# Patient Record
Sex: FEMALE | Race: WHITE | NOT HISPANIC OR LATINO | Employment: OTHER | URBAN - METROPOLITAN AREA
[De-identification: names, ages, dates, MRNs, and addresses within clinical notes are randomized per-mention and may not be internally consistent; named-entity substitution may affect disease eponyms.]

---

## 2023-09-09 PROBLEM — E55.9 VITAMIN D DEFICIENCY: Status: ACTIVE | Noted: 2023-09-09

## 2023-09-09 PROBLEM — E03.8 SUBCLINICAL HYPOTHYROIDISM: Status: ACTIVE | Noted: 2023-09-09

## 2023-09-09 PROBLEM — J01.10 ACUTE NON-RECURRENT FRONTAL SINUSITIS: Status: ACTIVE | Noted: 2023-09-09

## 2023-09-09 PROBLEM — N95.1 MENOPAUSAL STATE: Status: ACTIVE | Noted: 2023-09-09

## 2023-09-09 PROBLEM — D72.819 LEUKOPENIA: Status: ACTIVE | Noted: 2023-09-09

## 2023-09-09 PROBLEM — I25.10 ARTERIOSCLEROTIC HEART DISEASE: Status: ACTIVE | Noted: 2023-09-09

## 2023-09-09 PROBLEM — M81.0 OSTEOPOROSIS: Status: ACTIVE | Noted: 2023-09-09

## 2023-09-09 PROBLEM — E78.5 HYPERLIPIDEMIA: Status: ACTIVE | Noted: 2023-09-09

## 2023-09-09 PROBLEM — M85.80 OSTEOPENIA: Status: ACTIVE | Noted: 2023-09-09

## 2023-09-09 RX ORDER — IBUPROFEN 200 MG
200 TABLET ORAL
COMMUNITY

## 2023-09-09 RX ORDER — AMOXICILLIN 875 MG/1
875 TABLET, FILM COATED ORAL
COMMUNITY
Start: 2022-07-15 | End: 2024-04-16 | Stop reason: WASHOUT

## 2023-09-09 RX ORDER — CHOLECALCIFEROL (VITAMIN D3) 50 MCG
50 TABLET ORAL DAILY
COMMUNITY

## 2023-09-09 RX ORDER — CALCIUM CARBONATE 600 MG
600 TABLET ORAL
COMMUNITY

## 2023-09-27 ENCOUNTER — HOSPITAL ENCOUNTER (OUTPATIENT)
Dept: DATA CONVERSION | Facility: HOSPITAL | Age: 68
Discharge: HOME | End: 2023-09-27
Payer: MEDICARE

## 2023-09-27 DIAGNOSIS — E78.5 HYPERLIPIDEMIA, UNSPECIFIED: ICD-10-CM

## 2023-09-27 LAB
APPEARANCE PLAS: ABNORMAL
CHOLEST SERPL-MCNC: 277 MG/DL (ref 133–200)
CHOLEST/HDLC SERPL: 4.8 RATIO
COLOR SPUN FLD: ABNORMAL
FASTING STATUS PATIENT QL REPORTED: ABNORMAL
HDLC SERPL-MCNC: 58 MG/DL
LDLC SERPL CALC-MCNC: 181 MG/DL (ref 65–130)
TRIGL SERPL-MCNC: 190 MG/DL (ref 40–150)

## 2023-10-31 ENCOUNTER — TELEPHONE (OUTPATIENT)
Dept: PRIMARY CARE | Facility: CLINIC | Age: 68
End: 2023-10-31
Payer: MEDICARE

## 2023-10-31 DIAGNOSIS — H66.90 OTITIS MEDIA, UNSPECIFIED LATERALITY, UNSPECIFIED OTITIS MEDIA TYPE: Primary | ICD-10-CM

## 2023-10-31 RX ORDER — AZITHROMYCIN 250 MG/1
TABLET, FILM COATED ORAL
Qty: 6 TABLET | Refills: 0 | Status: SHIPPED | OUTPATIENT
Start: 2023-10-31 | End: 2023-11-05

## 2023-10-31 NOTE — TELEPHONE ENCOUNTER
Pt states she went to  [facility] on 10/22/23 for left ear pain and was given amoxicillin TID which she completed on 10/28/23.     Pt states she still gets occasional sharp pains in left ear and wondering if she needs another round of antibiotics or should be evaluated further.  Please advise.  Ph: [phone]

## 2023-11-07 ASSESSMENT — ENCOUNTER SYMPTOMS
ABDOMINAL PAIN: 0
SORE THROAT: 0
RHINORRHEA: 0
HEADACHES: 0
VOMITING: 0
DIARRHEA: 0
COUGH: 0
NECK PAIN: 0

## 2023-11-10 ENCOUNTER — OFFICE VISIT (OUTPATIENT)
Dept: OTOLARYNGOLOGY | Facility: CLINIC | Age: 68
End: 2023-11-10
Payer: MEDICARE

## 2023-11-10 DIAGNOSIS — H65.92 LEFT OTITIS MEDIA WITH EFFUSION: Primary | ICD-10-CM

## 2023-11-10 PROCEDURE — 69433 CREATE EARDRUM OPENING: CPT | Performed by: OTOLARYNGOLOGY

## 2023-11-10 PROCEDURE — 1160F RVW MEDS BY RX/DR IN RCRD: CPT | Performed by: OTOLARYNGOLOGY

## 2023-11-10 PROCEDURE — 1159F MED LIST DOCD IN RCRD: CPT | Performed by: OTOLARYNGOLOGY

## 2023-11-10 PROCEDURE — 99204 OFFICE O/P NEW MOD 45 MIN: CPT | Performed by: OTOLARYNGOLOGY

## 2023-11-10 RX ORDER — DEXAMETHASONE SODIUM PHOSPHATE 1 MG/ML
SOLUTION/ DROPS OPHTHALMIC
Qty: 5 ML | Refills: 0 | Status: SHIPPED | OUTPATIENT
Start: 2023-11-10 | End: 2024-04-16 | Stop reason: WASHOUT

## 2023-11-10 RX ORDER — OFLOXACIN 3 MG/ML
SOLUTION AURICULAR (OTIC)
Qty: 5 ML | Refills: 0 | Status: SHIPPED | OUTPATIENT
Start: 2023-11-10 | End: 2024-04-16 | Stop reason: WASHOUT

## 2023-11-10 ASSESSMENT — ENCOUNTER SYMPTOMS
COUGH: 0
VOMITING: 0
RHINORRHEA: 0
SORE THROAT: 0
DIARRHEA: 0
ABDOMINAL PAIN: 0
NECK PAIN: 0
HEADACHES: 0

## 2023-11-10 NOTE — LETTER
November 10, 2023     Castro Mackenzie MD  [address] [place] [address]  [address]  [place] [address]    Patient: Angela Samuel   YOB: 1955   Date of Visit: 11/10/2023       Dear Dr. Castro Mackenzie MD:    Thank you for referring Angela Samuel to me for evaluation. Below are my notes for this consultation.  If you have questions, please do not hesitate to call me. I look forward to following your patient along with you.       Sincerely,     Kathrin Wood MD      CC: No Recipients  ______________________________________________________________________________________    History Of Present Illness  Angela Samuel is a 68 y.o. female presenting with plugged left ear, which has been going on for the past 5 weeks. It happened after a uri. She has tried fluticasone nasal spray, oral amoxicillin and z pack. She has a history of left ear tubes, twice.     Off and on her left ear plugs. She has an upcoming flight next week.    On examination, effusion in the left middle ear(+).  Left myringotomy and tube insertion was done today in the office.  There was seromucoid effusion (mostly serous).    Plan:  1-follow-up in 1 month, with hearing test  2- ear drops       Medical History:  She has no past medical history on file.    Surgical History  She has no past surgical history on file.     Social History  She has no history on file for tobacco use, alcohol use, and drug use.    Family History  Family History   Problem Relation Name Age of Onset   • Diabetes Mother     • Hypertension Mother     • Kidney disease Mother     • No Known Problems Father     • Breast cancer Sister          Allergies  Patient has no known allergies.    Review of Systems   HENT:  Positive for ear pain and hearing loss. Negative for ear discharge, rhinorrhea and sore throat.    Respiratory:  Negative for cough.    Gastrointestinal:  Negative for abdominal pain, diarrhea and vomiting.   Musculoskeletal:  Negative for neck pain.   Skin:   Negative for rash.   Neurological:  Negative for headaches.      Decreased hearing in left ear    Physical Exam   General appearance: Healthy-appearing, well-nourished, well groomed, in no acute distress.     Head and Face: Atraumatic with no masses, lesions, or scarring.      Salivary glands: No tenderness of the parotid glands or parotid masses.     No tenderness of the submandibular glands or submandibular masses.      Facial strength: Normal strength and symmetry, no synkinesis or facial tic.     Eyes: Conjunctivas look non-hyperemic bilaterally    Ears: Bilaterally ear canals look normal. Tympanic membranes look intact,effusion (+) at left.    Nose: Mucosa looks normal. No purulent discharge.     Oral Cavity/Mouth: Lips and tongue look normal.     Throat: No postnasal discharge. No tonsil hypertrophy. No hyperemia.    Neck: Symmetrical, trachea midline.     Pulmonary: Normal respiratory effort.     Lymphatic: No palpable pathologic lymph nodes at neck.     Neurological/Psychiatric Orientation to person, place, and time: Normal.     Mood and affect: Normal.      Extremities: No clubbing.     Skin: No significant skin lesions were noted at face or neck    LEFT TUBE INSERTION 11.10.2023  Operative microscope was brought to patient's left ear. Topical phenol was applied inferiorly, then myringotomy was done using 21 g needle. Effusion was suctioned, white neha bobbin tube was inserted. Antibiotic drops were applied.    Procedure was concluded.      Assessment/Plan  Angela Samuel is a 68 y.o. female presenting with plugged left ear, which has been going on for the past 5 weeks. It happened after a uri. She has tried fluticasone nasal spray, oral amoxicillin and z pack. She has a history of left ear tubes, twice.     Off and on her left ear plugs. She has an upcoming flight next week.    On examination, effusion in the left middle ear(+).  Left myringotomy and tube insertion was done today in the office.   There was seromucoid effusion (mostly serous).    Plan:  1-follow-up in 1 month, with hearing test  2- ear drops    Kathrin Wood MD

## 2023-11-10 NOTE — PROGRESS NOTES
History Of Present Illness  Angela Samuel is a 68 y.o. female presenting with plugged left ear, which has been going on for the past 5 weeks. It happened after a uri. She has tried fluticasone nasal spray, oral amoxicillin and z pack. She has a history of left ear tubes, twice.     Off and on her left ear plugs. She has an upcoming flight next week.    On examination, effusion in the left middle ear(+).  Left myringotomy and tube insertion was done today in the office.  There was seromucoid effusion (mostly serous).    Plan:  1-follow-up in 1 month, with hearing test  2- ear drops       Medical History:  She has no past medical history on file.    Surgical History  She has no past surgical history on file.     Social History  She has no history on file for tobacco use, alcohol use, and drug use.    Family History  Family History   Problem Relation Name Age of Onset    Diabetes Mother      Hypertension Mother      Kidney disease Mother      No Known Problems Father      Breast cancer Sister          Allergies  Patient has no known allergies.    Review of Systems   HENT:  Positive for ear pain and hearing loss. Negative for ear discharge, rhinorrhea and sore throat.    Respiratory:  Negative for cough.    Gastrointestinal:  Negative for abdominal pain, diarrhea and vomiting.   Musculoskeletal:  Negative for neck pain.   Skin:  Negative for rash.   Neurological:  Negative for headaches.      Decreased hearing in left ear    Physical Exam   General appearance: Healthy-appearing, well-nourished, well groomed, in no acute distress.     Head and Face: Atraumatic with no masses, lesions, or scarring.      Salivary glands: No tenderness of the parotid glands or parotid masses.     No tenderness of the submandibular glands or submandibular masses.      Facial strength: Normal strength and symmetry, no synkinesis or facial tic.     Eyes: Conjunctivas look non-hyperemic bilaterally    Ears: Bilaterally ear canals look  normal. Tympanic membranes look intact,effusion (+) at left.    Nose: Mucosa looks normal. No purulent discharge.     Oral Cavity/Mouth: Lips and tongue look normal.     Throat: No postnasal discharge. No tonsil hypertrophy. No hyperemia.    Neck: Symmetrical, trachea midline.     Pulmonary: Normal respiratory effort.     Lymphatic: No palpable pathologic lymph nodes at neck.     Neurological/Psychiatric Orientation to person, place, and time: Normal.     Mood and affect: Normal.      Extremities: No clubbing.     Skin: No significant skin lesions were noted at face or neck    LEFT TUBE INSERTION 11.10.2023  Operative microscope was brought to patient's left ear. Topical phenol was applied inferiorly, then myringotomy was done using 21 g needle. Effusion was suctioned, white neha bobbin tube was inserted. Antibiotic drops were applied.    Procedure was concluded.      Assessment/Plan   Angela Samuel is a 68 y.o. female presenting with plugged left ear, which has been going on for the past 5 weeks. It happened after a uri. She has tried fluticasone nasal spray, oral amoxicillin and z pack. She has a history of left ear tubes, twice.     Off and on her left ear plugs. She has an upcoming flight next week.    On examination, effusion in the left middle ear(+).  Left myringotomy and tube insertion was done today in the office.  There was seromucoid effusion (mostly serous).    Plan:  1-follow-up in 1 month, with hearing test  2- ear drops    Kathrin Wood MD

## 2023-11-29 ENCOUNTER — APPOINTMENT (OUTPATIENT)
Dept: OTOLARYNGOLOGY | Facility: CLINIC | Age: 68
End: 2023-11-29

## 2023-12-06 ENCOUNTER — CLINICAL SUPPORT (OUTPATIENT)
Dept: AUDIOLOGY | Facility: CLINIC | Age: 68
End: 2023-12-06
Payer: MEDICARE

## 2023-12-06 DIAGNOSIS — Z96.22 MYRINGOTOMY TUBE STATUS: Primary | ICD-10-CM

## 2023-12-06 DIAGNOSIS — H93.19 SUBJECTIVE TINNITUS, UNSPECIFIED LATERALITY: ICD-10-CM

## 2023-12-06 DIAGNOSIS — H90.3 SENSORINEURAL HEARING LOSS (SNHL) OF BOTH EARS: ICD-10-CM

## 2023-12-06 DIAGNOSIS — R94.128: ICD-10-CM

## 2023-12-06 PROCEDURE — 92557 COMPREHENSIVE HEARING TEST: CPT | Performed by: AUDIOLOGIST

## 2023-12-06 PROCEDURE — 92567 TYMPANOMETRY: CPT | Performed by: AUDIOLOGIST

## 2023-12-06 ASSESSMENT — ENCOUNTER SYMPTOMS
OCCASIONAL FEELINGS OF UNSTEADINESS: 0
DEPRESSION: 0
LOSS OF SENSATION IN FEET: 0

## 2023-12-06 ASSESSMENT — PAIN - FUNCTIONAL ASSESSMENT: PAIN_FUNCTIONAL_ASSESSMENT: 0-10

## 2023-12-06 ASSESSMENT — PAIN SCALES - GENERAL: PAINLEVEL_OUTOF10: 0 - NO PAIN

## 2023-12-06 NOTE — LETTER
December 6, 2023     Castro Mackenzie MD  [address]  [address]  [place] [address]    Patient: Angela Samuel   YOB: 1955   Date of Visit: 12/6/2023       Dear Dr. Castro Mackenzie MD:    Thank you for referring Angela Samuel to me for evaluation. Below are my notes for this consultation.  If you have questions, please do not hesitate to call me. I look forward to following your patient along with you.       Sincerely,     Sarah J Lombardo, ROSY, CCC-A      CC: Kathrin Wood MD  ______________________________________________________________________________________    Angela Samuel, age 68 years, is here today for a hearing evaluation.     Difficulty hearing - no  Tinnitus - yes, unspecified laterality   Excessive noise exposure - no  Chronic ear infections - yes  Ear pain - no  Ear drainage - no  Past ear surgery - yes, third ear ventilation tube in the left   Vertigo - no  Dizziness - no  Past hearing aid use - no  Family history - no    Appointment time: 1:45-2:20    Otoscopy revealed clear ear canals with visual inspection of the ear ventilation tubes bilaterally.    Behavioral hearing evaluation:  Right ear - normal hearing sensitivity to mild sensorineural hearing loss 250-8000 Hz  Left ear - normal hearing sensitivity to mild sensorineural hearing loss 250-8000 Hz    Speech reception thresholds obtained at a level consistent with pure tone thresholds bilaterally.    Word discrimination:  Right ear - excellent (96%)  Left ear - excellent (96%)    Tympanometry:  Right ear - Type A, normal middle ear function  Left ear - Type B, no pressure peak with a large ear canal volume (consistent with a patent ear ventilation tube)    Ipsilateral acoustic reflexes:  Right ear - present 500-4000 Hz  Left ear - could not be tested due to tube    Recommendations:  1) Follow up with Dr Wood

## 2023-12-06 NOTE — PROGRESS NOTES
Angela Samuel, age 68 years, is here today for a hearing evaluation.     Difficulty hearing - no  Tinnitus - yes, unspecified laterality   Excessive noise exposure - no  Chronic ear infections - yes  Ear pain - no  Ear drainage - no  Past ear surgery - yes, third ear ventilation tube in the left   Vertigo - no  Dizziness - no  Past hearing aid use - no  Family history - no    Appointment time: 1:45-2:20    Otoscopy revealed clear ear canals with visual inspection of the ear ventilation tubes bilaterally.    Behavioral hearing evaluation:  Right ear - normal hearing sensitivity to mild sensorineural hearing loss 250-8000 Hz  Left ear - normal hearing sensitivity to mild sensorineural hearing loss 250-8000 Hz    Speech reception thresholds obtained at a level consistent with pure tone thresholds bilaterally.    Word discrimination:  Right ear - excellent (96%)  Left ear - excellent (96%)    Tympanometry:  Right ear - Type A, normal middle ear function  Left ear - Type B, no pressure peak with a large ear canal volume (consistent with a patent ear ventilation tube)    Ipsilateral acoustic reflexes:  Right ear - present 500-4000 Hz  Left ear - could not be tested due to tube    Recommendations:  1) Follow up with Dr Wood

## 2024-03-26 ENCOUNTER — LAB REQUISITION (OUTPATIENT)
Dept: LAB | Facility: HOSPITAL | Age: 69
End: 2024-03-26
Payer: MEDICARE

## 2024-03-26 PROCEDURE — 87086 URINE CULTURE/COLONY COUNT: CPT

## 2024-03-27 ENCOUNTER — OFFICE VISIT (OUTPATIENT)
Dept: OTOLARYNGOLOGY | Facility: CLINIC | Age: 69
End: 2024-03-27
Payer: MEDICARE

## 2024-03-27 DIAGNOSIS — Z96.22 MYRINGOTOMY TUBE STATUS: Primary | ICD-10-CM

## 2024-03-27 PROCEDURE — 99213 OFFICE O/P EST LOW 20 MIN: CPT | Performed by: OTOLARYNGOLOGY

## 2024-03-27 ASSESSMENT — ENCOUNTER SYMPTOMS
ABDOMINAL PAIN: 0
VOMITING: 0
RHINORRHEA: 0
COUGH: 0
NECK PAIN: 0
HEADACHES: 0
DIARRHEA: 0
SORE THROAT: 0

## 2024-03-27 NOTE — PROGRESS NOTES
History Of Present Illness    03.27.24: She has been to urgent care yesterday for uri, her ears were checked and she was told that there was redness around her left ear tube..     On examination, left tympanic membrane has no hyperemia, there is a prominent blood vessel close to left ventilation tube.  Left ventilation tube looks open.  No granulation or discharge.    Recommendation  1-follow-up in 6 months  ______________________________________________________________________    11.10.2023: Angela Samuel is a 69 y.o. female presenting with plugged left ear, which has been going on for the past 5 weeks. It happened after a uri. She has tried fluticasone nasal spray, oral amoxicillin and z pack. She has a history of left ear tubes, twice.     Off and on her left ear plugs. She has an upcoming flight next week.    On examination, effusion in the left middle ear(+).  Left myringotomy and tube insertion was done today in the office.  There was seromucoid effusion (mostly serous).    Plan:  1-follow-up in 1 month, with hearing test  2- ear drops       Medical History:  She has no past medical history on file.    Surgical History  She has no past surgical history on file.     Social History  She has no history on file for tobacco use, alcohol use, and drug use.    Family History  Family History   Problem Relation Name Age of Onset    Diabetes Mother      Hypertension Mother      Kidney disease Mother      No Known Problems Father      Breast cancer Sister          Allergies  Patient has no known allergies.    Review of Systems   HENT:  Positive for ear pain and hearing loss. Negative for ear discharge, rhinorrhea and sore throat.    Respiratory:  Negative for cough.    Gastrointestinal:  Negative for abdominal pain, diarrhea and vomiting.   Musculoskeletal:  Negative for neck pain.   Skin:  Negative for rash.   Neurological:  Negative for headaches.      Decreased hearing in left ear    Physical Exam   General  appearance: Healthy-appearing, well-nourished, well groomed, in no acute distress.     Head and Face: Atraumatic with no masses, lesions, or scarring.      Salivary glands: No tenderness of the parotid glands or parotid masses.     No tenderness of the submandibular glands or submandibular masses.      Facial strength: Normal strength and symmetry, no synkinesis or facial tic.     Eyes: Conjunctivas look non-hyperemic bilaterally    Ears: Bilaterally ear canals look normal. Tympanic membranes look intact,effusion (+) at left.    Nose: Mucosa looks normal. No purulent discharge.     Oral Cavity/Mouth: Lips and tongue look normal.     Throat: No postnasal discharge. No tonsil hypertrophy. No hyperemia.    Neck: Symmetrical, trachea midline.     Pulmonary: Normal respiratory effort.     Lymphatic: No palpable pathologic lymph nodes at neck.     Neurological/Psychiatric Orientation to person, place, and time: Normal.     Mood and affect: Normal.      Extremities: No clubbing.     Skin: No significant skin lesions were noted at face or neck    LEFT TUBE INSERTION 11.10.2023  Operative microscope was brought to patient's left ear. Topical phenol was applied inferiorly, then myringotomy was done using 21 g needle. Effusion was suctioned, white neha bobbin tube was inserted. Antibiotic drops were applied.    Procedure was concluded.      Assessment/Plan     03.27.24: She has been to urgent care yesterday for uri, her ears were checked and she was told that there was redness around her left ear tube..     On examination, left tympanic membrane has no hyperemia, there is a prominent blood vessel close to left ventilation tube.  Left ventilation tube looks open.  No granulation or discharge.    Recommendation  1-follow-up in 6 months  ______________________________________________________________________    Angela Samuel is a 68 y.o. female presenting with plugged left ear, which has been going on for the past 5 weeks.  It happened after a uri. She has tried fluticasone nasal spray, oral amoxicillin and z pack. She has a history of left ear tubes, twice.     Off and on her left ear plugs. She has an upcoming flight next week.    On examination, effusion in the left middle ear(+).  Left myringotomy and tube insertion was done today in the office.  There was seromucoid effusion (mostly serous).    Plan:  1-follow-up in 1 month, with hearing test  2- ear drops    Beronica Petty

## 2024-03-28 ENCOUNTER — TELEPHONE (OUTPATIENT)
Dept: PRIMARY CARE | Facility: CLINIC | Age: 69
End: 2024-03-28
Payer: MEDICARE

## 2024-03-28 LAB — BACTERIA UR CULT: NORMAL

## 2024-03-28 NOTE — TELEPHONE ENCOUNTER
Pt went to  [facility] on 3/26 was given Augmentin for UTI. PT states results on mychart came back normal. Can she stop medication?

## 2024-04-08 ENCOUNTER — LAB (OUTPATIENT)
Dept: LAB | Facility: LAB | Age: 69
End: 2024-04-08
Payer: MEDICARE

## 2024-04-08 DIAGNOSIS — E78.5 HYPERLIPIDEMIA, UNSPECIFIED: Primary | ICD-10-CM

## 2024-04-08 DIAGNOSIS — E55.9 VITAMIN D DEFICIENCY, UNSPECIFIED: ICD-10-CM

## 2024-04-08 LAB
25(OH)D3 SERPL-MCNC: 38 NG/ML (ref 31–100)
ALBUMIN SERPL-MCNC: 4.7 G/DL (ref 3.5–5)
ALP BLD-CCNC: 87 U/L (ref 35–125)
ALT SERPL-CCNC: 16 U/L (ref 5–40)
ANION GAP SERPL CALC-SCNC: 16 MMOL/L
AST SERPL-CCNC: 21 U/L (ref 5–40)
BASOPHILS # BLD AUTO: 0.04 X10*3/UL (ref 0–0.1)
BASOPHILS NFR BLD AUTO: 0.6 %
BILIRUB SERPL-MCNC: 0.3 MG/DL (ref 0.1–1.2)
BUN SERPL-MCNC: 12 MG/DL (ref 8–25)
CALCIUM SERPL-MCNC: 9.7 MG/DL (ref 8.5–10.4)
CHLORIDE SERPL-SCNC: 102 MMOL/L (ref 97–107)
CHOLEST SERPL-MCNC: 255 MG/DL (ref 133–200)
CHOLEST/HDLC SERPL: 5 {RATIO}
CO2 SERPL-SCNC: 25 MMOL/L (ref 24–31)
CREAT SERPL-MCNC: 0.8 MG/DL (ref 0.4–1.6)
EGFRCR SERPLBLD CKD-EPI 2021: 79 ML/MIN/1.73M*2
EOSINOPHIL # BLD AUTO: 0.16 X10*3/UL (ref 0–0.7)
EOSINOPHIL NFR BLD AUTO: 2.6 %
ERYTHROCYTE [DISTWIDTH] IN BLOOD BY AUTOMATED COUNT: 13 % (ref 11.5–14.5)
GLUCOSE SERPL-MCNC: 100 MG/DL (ref 65–99)
HCT VFR BLD AUTO: 43.4 % (ref 36–46)
HDLC SERPL-MCNC: 51 MG/DL
HGB BLD-MCNC: 13.8 G/DL (ref 12–16)
IMM GRANULOCYTES # BLD AUTO: 0.02 X10*3/UL (ref 0–0.7)
IMM GRANULOCYTES NFR BLD AUTO: 0.3 % (ref 0–0.9)
LDLC SERPL CALC-MCNC: 147 MG/DL (ref 65–130)
LYMPHOCYTES # BLD AUTO: 2.05 X10*3/UL (ref 1.2–4.8)
LYMPHOCYTES NFR BLD AUTO: 33.2 %
MCH RBC QN AUTO: 29.3 PG (ref 26–34)
MCHC RBC AUTO-ENTMCNC: 31.8 G/DL (ref 32–36)
MCV RBC AUTO: 92 FL (ref 80–100)
MONOCYTES # BLD AUTO: 0.46 X10*3/UL (ref 0.1–1)
MONOCYTES NFR BLD AUTO: 7.5 %
NEUTROPHILS # BLD AUTO: 3.44 X10*3/UL (ref 1.2–7.7)
NEUTROPHILS NFR BLD AUTO: 55.8 %
NRBC BLD-RTO: 0 /100 WBCS (ref 0–0)
PLATELET # BLD AUTO: 311 X10*3/UL (ref 150–450)
POTASSIUM SERPL-SCNC: 4.4 MMOL/L (ref 3.4–5.1)
PROT SERPL-MCNC: 6.8 G/DL (ref 5.9–7.9)
RBC # BLD AUTO: 4.71 X10*6/UL (ref 4–5.2)
SODIUM SERPL-SCNC: 143 MMOL/L (ref 133–145)
T4 FREE SERPL-MCNC: 1 NG/DL (ref 0.9–1.7)
TRIGL SERPL-MCNC: 286 MG/DL (ref 40–150)
TSH SERPL DL<=0.05 MIU/L-ACNC: 7.36 MIU/L (ref 0.27–4.2)
WBC # BLD AUTO: 6.2 X10*3/UL (ref 4.4–11.3)

## 2024-04-08 PROCEDURE — 85025 COMPLETE CBC W/AUTO DIFF WBC: CPT

## 2024-04-08 PROCEDURE — 82306 VITAMIN D 25 HYDROXY: CPT

## 2024-04-08 PROCEDURE — 80053 COMPREHEN METABOLIC PANEL: CPT

## 2024-04-08 PROCEDURE — 84439 ASSAY OF FREE THYROXINE: CPT

## 2024-04-08 PROCEDURE — 80061 LIPID PANEL: CPT

## 2024-04-08 PROCEDURE — 36415 COLL VENOUS BLD VENIPUNCTURE: CPT

## 2024-04-08 PROCEDURE — 84443 ASSAY THYROID STIM HORMONE: CPT

## 2024-04-15 ASSESSMENT — ENCOUNTER SYMPTOMS
HEADACHES: 0
ABDOMINAL PAIN: 0
APPETITE CHANGE: 0
VOMITING: 0
DIARRHEA: 0
CHILLS: 0
FEVER: 0
COUGH: 0
SHORTNESS OF BREATH: 0
NAUSEA: 0

## 2024-04-15 NOTE — PROGRESS NOTES
[facility]: [facility]  MEDICARE WELLNESS EXAM      Angela Samuel is a 69 y.o. female that is presenting today for Annual Exam (Pt states that she had a tube put in her left ear back in 12/2023. Pt states that she feels like it might be clogged. Pt states that she went to an ENT and they didn't do anything for her and its still bothering her).    Assessment/Plan    Diagnoses and all orders for this visit:  Annual physical exam  -     Referral to ENT; Future  Osteoporosis, unspecified osteoporosis type, unspecified pathological fracture presence  Subclinical hypothyroidism  -     TSH with reflex to Free T4 if abnormal; Future  Encounter for screening mammogram for breast cancer  -     BI mammo bilateral screening tomosynthesis; Future  Encounter for routine laboratory testing  -     CBC and Auto Differential; Future  -     Comprehensive Metabolic Panel; Future  -     Lipid Panel; Future  -     Hemoglobin A1C; Future  -     Vitamin D 25-Hydroxy,Total (for eval of Vitamin D levels); Future  -     TSH with reflex to Free T4 if abnormal; Future  Vitamin D deficiency  -     Vitamin D 25-Hydroxy,Total (for eval of Vitamin D levels); Future  Hyperlipidemia, unspecified hyperlipidemia type  -     CBC and Auto Differential; Future  -     Comprehensive Metabolic Panel; Future  -     Lipid Panel; Future  Hyperglycemia  -     Hemoglobin A1C; Future  Other orders  -     Follow Up In Primary Care - Health Maintenance; Future    Lipids slightly improved.  Calcium score was zero last year.  Reviewed subclinical hypothyroidism w/ Tsh approx 7,  Has been seeing ENT, has a tube in left ear and feeling clogged - hearing affected - will refer for another opinion as requested.    ACTIVITIES OF DAILY LIVING  Basic ADLs:  Bathing: Independent, Dressing: Independent, Toileting: Independent, Transferring: Independent, Continence: Independent, Feeding: Independent.    Instrumental ADLs:  Ability to use phone:  Independent, Shopping: Independent, Cooking: Independent, House-keeping: Independent, Laundry: Independent, Transportation: Independent, Medication Management: Independent, Finance Management: Independent.    Subjective   HPI  This patient presents today for annual physical, Medicare wellness exam.  Discussed screening/prevention, healthy lifestyle and code status.   Reviewed the patient's wishing regarding decision making.    Review of Systems   Constitutional:  Negative for appetite change, chills and fever.   Respiratory:  Negative for cough and shortness of breath.    Cardiovascular:  Negative for chest pain.   Gastrointestinal:  Negative for abdominal pain, diarrhea, nausea and vomiting.   Neurological:  Negative for headaches.   All other systems reviewed and are negative.    Objective   Vitals:    04/16/24 0824   BP: 138/70   Pulse: 71   Temp: 36.4 °C (97.6 °F)   SpO2: 98%      Body mass index is 21.22 kg/m².  Physical Exam  Vitals reviewed.   Constitutional:       General: She is not in acute distress.     Appearance: She is not toxic-appearing.   HENT:      Head: Normocephalic and atraumatic.      Mouth/Throat:      Mouth: Mucous membranes are moist.   Eyes:      Pupils: Pupils are equal, round, and reactive to light.   Cardiovascular:      Rate and Rhythm: Normal rate and regular rhythm.      Heart sounds: No murmur heard.  Pulmonary:      Breath sounds: Normal breath sounds. No wheezing, rhonchi or rales.   Abdominal:      General: There is no distension.      Palpations: Abdomen is soft.   Musculoskeletal:      Right lower leg: No edema.      Left lower leg: No edema.   Neurological:      General: No focal deficit present.      Mental Status: She is alert and oriented to person, place, and time.       Diagnostic Results   Lab Results   Component Value Date    GLUCOSE 100 (H) 04/08/2024    CALCIUM 9.7 04/08/2024     04/08/2024    K 4.4 04/08/2024    CO2 25 04/08/2024     04/08/2024    BUN 12  "04/08/2024    CREATININE 0.80 04/08/2024     Lab Results   Component Value Date    ALT 16 04/08/2024    AST 21 04/08/2024    ALKPHOS 87 04/08/2024    BILITOT 0.3 04/08/2024     Lab Results   Component Value Date    WBC 6.2 04/08/2024    HGB 13.8 04/08/2024    HCT 43.4 04/08/2024    MCV 92 04/08/2024     04/08/2024     Lab Results   Component Value Date    CHOL 255 (H) 04/08/2024    CHOL 277 (H) 09/27/2023    CHOL 262 (H) 03/21/2023     Lab Results   Component Value Date    HDL 51.0 04/08/2024    HDL 58 09/27/2023    HDL 56 03/21/2023     Lab Results   Component Value Date    LDLCALC 147 (H) 04/08/2024    LDLCALC 181 (H) 09/27/2023    LDLCALC 161 (H) 03/21/2023     Lab Results   Component Value Date    TRIG 286 (H) 04/08/2024    TRIG 190 (H) 09/27/2023    TRIG 227 (H) 03/21/2023     No components found for: \"CHOLHDL\"  Lab Results   Component Value Date    HGBA1C 5.7 03/21/2023     Other labs not included in the list above reviewed either before or during this encounter.    History   History reviewed. No pertinent past medical history.  History reviewed. No pertinent surgical history.  Family History   Problem Relation Name Age of Onset    Diabetes Mother      Hypertension Mother      Kidney disease Mother      No Known Problems Father      Breast cancer Sister       Social History     Socioeconomic History    Marital status:      Spouse name: Not on file    Number of children: Not on file    Years of education: Not on file    Highest education level: Not on file   Occupational History    Not on file   Tobacco Use    Smoking status: Never    Smokeless tobacco: Never   Vaping Use    Vaping status: Never Used   Substance and Sexual Activity    Alcohol use: Yes     Comment: social    Drug use: Never    Sexual activity: Not on file   Other Topics Concern    Not on file   Social History Narrative    Not on file     Social Determinants of Health     Financial Resource Strain: Not on file   Food Insecurity: Not " on file   Transportation Needs: Not on file   Physical Activity: Not on file   Stress: Not on file   Social Connections: Not on file   Intimate Partner Violence: Not on file   Housing Stability: Not on file     No Known Allergies  Current Outpatient Medications on File Prior to Visit   Medication Sig Dispense Refill    calcium carbonate 600 mg calcium (1,500 mg) tablet Take 1 tablet (600 mg) by mouth 2 times a day with meals.      cholecalciferol (Vitamin D-3) 50 MCG (2000 UT) tablet Take 1 tablet (50 mcg) by mouth once daily.      ibuprofen (AdviL) 200 mg tablet Take 1 tablet (200 mg) by mouth 3 times a day with meals.      amoxicillin (Amoxil) 875 mg tablet Take 1 tablet (875 mg) by mouth 2 times a day with meals.      dexAMETHasone (Decadron) 0.1 % ophthalmic solution Instill 4 drops into affected ear(s); twice a day; for 10 days (Patient not taking: Reported on 4/16/2024) 5 mL 0    ofloxacin (Floxin) 0.3 % otic solution Instill 4 drops into LEFT ear(s) twice a day for 10 days (Patient not taking: Reported on 4/16/2024) 5 mL 0     No current facility-administered medications on file prior to visit.     Immunization History   Administered Date(s) Administered    DTaP vaccine, pediatric  (INFANRIX) 05/09/2012    Flu vaccine, quadrivalent, high-dose, preservative free, age 65y+ (FLUZONE) 08/18/2020    Pfizer Gray Cap SARS-CoV-2 05/29/2022    Pfizer Purple Cap SARS-CoV-2 03/02/2021, 03/23/2021, 09/29/2021    Pneumococcal conjugate vaccine, 13-valent (PREVNAR 13) 02/25/2019    Pneumococcal polysaccharide vaccine, 23-valent, age 2 years and older (PNEUMOVAX 23) 02/27/2020    Zoster vaccine, recombinant, adult (SHINGRIX) 05/16/2019, 07/19/2019    Zoster, live 11/01/2017     Patient's medical history was reviewed and updated either before or during this encounter.     Castro Mackenzie MD

## 2024-04-16 ENCOUNTER — OFFICE VISIT (OUTPATIENT)
Dept: PRIMARY CARE | Facility: CLINIC | Age: 69
End: 2024-04-16
Payer: MEDICARE

## 2024-04-16 VITALS
DIASTOLIC BLOOD PRESSURE: 70 MMHG | WEIGHT: 116 LBS | HEART RATE: 71 BPM | BODY MASS INDEX: 21.35 KG/M2 | HEIGHT: 62 IN | SYSTOLIC BLOOD PRESSURE: 138 MMHG | TEMPERATURE: 97.6 F | OXYGEN SATURATION: 98 %

## 2024-04-16 DIAGNOSIS — Z00.00 ANNUAL PHYSICAL EXAM: Primary | ICD-10-CM

## 2024-04-16 DIAGNOSIS — R73.9 HYPERGLYCEMIA: ICD-10-CM

## 2024-04-16 DIAGNOSIS — Z01.89 ENCOUNTER FOR ROUTINE LABORATORY TESTING: ICD-10-CM

## 2024-04-16 DIAGNOSIS — Z12.31 ENCOUNTER FOR SCREENING MAMMOGRAM FOR BREAST CANCER: ICD-10-CM

## 2024-04-16 DIAGNOSIS — E55.9 VITAMIN D DEFICIENCY: ICD-10-CM

## 2024-04-16 DIAGNOSIS — E78.5 HYPERLIPIDEMIA, UNSPECIFIED HYPERLIPIDEMIA TYPE: ICD-10-CM

## 2024-04-16 DIAGNOSIS — E03.8 SUBCLINICAL HYPOTHYROIDISM: ICD-10-CM

## 2024-04-16 DIAGNOSIS — M81.0 OSTEOPOROSIS, UNSPECIFIED OSTEOPOROSIS TYPE, UNSPECIFIED PATHOLOGICAL FRACTURE PRESENCE: ICD-10-CM

## 2024-04-16 PROCEDURE — 1123F ACP DISCUSS/DSCN MKR DOCD: CPT | Performed by: INTERNAL MEDICINE

## 2024-04-16 PROCEDURE — G0439 PPPS, SUBSEQ VISIT: HCPCS | Performed by: INTERNAL MEDICINE

## 2024-04-16 PROCEDURE — 1159F MED LIST DOCD IN RCRD: CPT | Performed by: INTERNAL MEDICINE

## 2024-04-16 PROCEDURE — 1158F ADVNC CARE PLAN TLK DOCD: CPT | Performed by: INTERNAL MEDICINE

## 2024-04-16 PROCEDURE — 1126F AMNT PAIN NOTED NONE PRSNT: CPT | Performed by: INTERNAL MEDICINE

## 2024-04-16 PROCEDURE — 1036F TOBACCO NON-USER: CPT | Performed by: INTERNAL MEDICINE

## 2024-04-16 PROCEDURE — 99215 OFFICE O/P EST HI 40 MIN: CPT | Performed by: INTERNAL MEDICINE

## 2024-04-16 ASSESSMENT — PATIENT HEALTH QUESTIONNAIRE - PHQ9
1. LITTLE INTEREST OR PLEASURE IN DOING THINGS: NOT AT ALL
SUM OF ALL RESPONSES TO PHQ9 QUESTIONS 1 AND 2: 0
2. FEELING DOWN, DEPRESSED OR HOPELESS: NOT AT ALL

## 2024-04-16 ASSESSMENT — PAIN SCALES - GENERAL: PAINLEVEL: 0-NO PAIN

## 2024-05-01 ENCOUNTER — APPOINTMENT (OUTPATIENT)
Dept: OTOLARYNGOLOGY | Facility: CLINIC | Age: 69
End: 2024-05-01
Payer: MEDICARE

## 2024-05-23 ENCOUNTER — OFFICE VISIT (OUTPATIENT)
Dept: OTOLARYNGOLOGY | Facility: CLINIC | Age: 69
End: 2024-05-23
Payer: MEDICARE

## 2024-05-23 VITALS — HEIGHT: 61 IN | BODY MASS INDEX: 21.9 KG/M2 | TEMPERATURE: 96.9 F | WEIGHT: 116 LBS

## 2024-05-23 DIAGNOSIS — Z00.00 ANNUAL PHYSICAL EXAM: ICD-10-CM

## 2024-05-23 DIAGNOSIS — H69.92 DYSFUNCTION OF LEFT EUSTACHIAN TUBE: Primary | ICD-10-CM

## 2024-05-23 DIAGNOSIS — H90.3 BILATERAL SENSORINEURAL HEARING LOSS: ICD-10-CM

## 2024-05-23 PROCEDURE — 1160F RVW MEDS BY RX/DR IN RCRD: CPT | Performed by: OTOLARYNGOLOGY

## 2024-05-23 PROCEDURE — 1159F MED LIST DOCD IN RCRD: CPT | Performed by: OTOLARYNGOLOGY

## 2024-05-23 PROCEDURE — 99213 OFFICE O/P EST LOW 20 MIN: CPT | Performed by: OTOLARYNGOLOGY

## 2024-05-23 PROCEDURE — 1036F TOBACCO NON-USER: CPT | Performed by: OTOLARYNGOLOGY

## 2024-05-23 RX ORDER — FLUTICASONE PROPIONATE 50 MCG
1 SPRAY, SUSPENSION (ML) NASAL DAILY
COMMUNITY

## 2024-05-23 NOTE — PROGRESS NOTES
"Chief Complaint   Patient presents with    New Patient Visit     NP- LT TUBE PLACED BY DR. WOOD IN NOV, EAR FEELS BLOCKED REFERRED BY PCP USING FLONASE     HPI:  Angela Samuel is a 69 y.o. female who presents today for evaluation of her left ear.  Starting in January 2024 it felt plugged like she had fluid in it.  She has been using Flonase from her PCP and over the past week it is actually on plugged and feels back to normal.  Denies any hearing loss, tinnitus, pressure, pain.  She has a history of a left tube placed by Dr. Wood on November 10, 2023 for effusion and plugged ear.  This was her third left myringotomy tube.  An audiogram performed afterwards in December 2023 shows mild sensorineural hearing loss without a conductive component and a high volume flat tympanogram on the left consistent with a patent tube.  She did well after the tube was placed in November 2023.  It were quite well and she was able to travel without any difficulty.  However in January it felt plugged up again.    PMH:  History reviewed. No pertinent past medical history.  History reviewed. No pertinent surgical history.      Medications:     Current Outpatient Medications:     calcium carbonate 600 mg calcium (1,500 mg) tablet, Take 1 tablet (600 mg) by mouth 2 times a day with meals., Disp: , Rfl:     cholecalciferol (Vitamin D-3) 50 MCG (2000 UT) tablet, Take 1 tablet (50 mcg) by mouth once daily., Disp: , Rfl:     fluticasone (Flonase) 50 mcg/actuation nasal spray, Administer 1 spray into each nostril once daily. Shake gently. Before first use, prime pump. After use, clean tip and replace cap., Disp: , Rfl:     ibuprofen (AdviL) 200 mg tablet, Take 1 tablet (200 mg) by mouth 3 times daily (morning, midday, late afternoon)., Disp: , Rfl:      Allergies:  No Known Allergies     ROS:  Review of systems normal unless stated otherwise in the HPI and/or PMH.    Physical Exam:  Temperature 36.1 °C (96.9 °F), height 1.549 m (5' 1\"), " "weight 52.6 kg (116 lb). Body mass index is 21.92 kg/m².     GENERAL APPEARANCE: Well developed and well nourished.  Alert and oriented in no acute distress.  Normal vocal quality.      HEAD/FACE: No erythema or edema or facial tenderness.  Normal facial nerve function bilaterally.    EAR:       EXTERNAL: Normal pinnas and external auditory canals without lesion or obstructing wax.       MIDDLE EAR: Tympanic membranes intact and mobile with normal landmarks.  Middle ear space appears well aerated.  No middle ear effusions.       TUBE STATUS: Left tube has extruded and is plugged and nonfunctional.  Is just lying on a healed left tympanic membrane and was removed today with the use of operating microscope and alligator forceps.       MASTOID CAVITY: N/A       HEARING: Gross hearing assessment is within normal limits.      NOSE:       VISUALIZED USING: Anterior rhinoscopy with headlight and nasal speculum.       DORSUM: Midline, nontraumatic appearance.       MUCOSA: Normal-appearing.       SECRETIONS: Normal.       SEPTUM: Midline and nonobstructing.       INFERIOR TURBINATES: Normal.       MIDDLE TURBINATES/MEATUS: N/A       BLEEDING: N/A         ORAL CAVITY/PHARYNX:       TEETH: Adequate dentition.       TONGUE: No mass or lesion.  Normal mobility.       FLOOR OF MOUTH: No mass or lesion.       PALATE: Normal hard palate, soft palate, and uvula.       OROPHARYNX: Normal without mass or lesion.  Tonsils absent       BUCCAL MUCOSA/GBS: Normal without mass or lesion.       LIPS: Normal.    LARYNX/HYPOPHARYNX/NASOPHARYNX: N/A    NECK: No palpable masses or abnormal adenopathy.  Trachea is midline.    THYROID: No thyromegaly or palpable nodule.    SALIVARY GLANDS: Normal bilateral parotid and submandibular glands by inspection and palpation.    TMJ's: Normal.    NEURO: Cranial nerve exam grossly normal bilaterally.       Assessment/Plan   Angela \"Arelis\" was seen today for new patient visit.  Diagnoses and all orders " for this visit:  Dysfunction of left eustachian tube (Primary)  Annual physical exam  -     Referral to ENT  Bilateral sensorineural hearing loss     Fortunately she is feeling better and her exam is normal as well and she requires no further attention.  From her chronic and recurrent left-sided eustachian tube dysfunction I do recommend she use Flonase on a daily basis.  If it plugs up recommend she follow-up for further evaluation possible need for another tube.  If she is doing well we will see her again in about a year with an audiogram.  Follow up in about 1 year (around 5/23/2025) for audiogram, sooner with changes or worsening symptoms.     Timothy Betancur MD

## 2024-05-28 ENCOUNTER — HOSPITAL ENCOUNTER (OUTPATIENT)
Dept: RADIOLOGY | Facility: CLINIC | Age: 69
Discharge: HOME | End: 2024-05-28
Payer: MEDICARE

## 2024-05-28 VITALS — HEIGHT: 62 IN | WEIGHT: 115.96 LBS | BODY MASS INDEX: 21.34 KG/M2

## 2024-05-28 DIAGNOSIS — Z12.31 ENCOUNTER FOR SCREENING MAMMOGRAM FOR BREAST CANCER: ICD-10-CM

## 2024-05-28 PROCEDURE — 77067 SCR MAMMO BI INCL CAD: CPT

## 2024-05-28 PROCEDURE — 77063 BREAST TOMOSYNTHESIS BI: CPT | Performed by: STUDENT IN AN ORGANIZED HEALTH CARE EDUCATION/TRAINING PROGRAM

## 2024-05-28 PROCEDURE — 77067 SCR MAMMO BI INCL CAD: CPT | Performed by: STUDENT IN AN ORGANIZED HEALTH CARE EDUCATION/TRAINING PROGRAM

## 2024-09-25 ENCOUNTER — APPOINTMENT (OUTPATIENT)
Dept: OTOLARYNGOLOGY | Facility: CLINIC | Age: 69
End: 2024-09-25
Payer: MEDICARE

## 2025-01-23 ENCOUNTER — APPOINTMENT (OUTPATIENT)
Dept: AUDIOLOGY | Facility: CLINIC | Age: 70
End: 2025-01-23
Payer: MEDICARE

## 2025-01-23 ENCOUNTER — APPOINTMENT (OUTPATIENT)
Dept: OTOLARYNGOLOGY | Facility: CLINIC | Age: 70
End: 2025-01-23
Payer: MEDICARE

## 2025-01-23 VITALS — HEIGHT: 62 IN | BODY MASS INDEX: 22.26 KG/M2 | TEMPERATURE: 97.1 F | WEIGHT: 121 LBS

## 2025-01-23 DIAGNOSIS — H69.92 DYSFUNCTION OF LEFT EUSTACHIAN TUBE: ICD-10-CM

## 2025-01-23 DIAGNOSIS — H69.92 DYSFUNCTION OF LEFT EUSTACHIAN TUBE: Primary | ICD-10-CM

## 2025-01-23 DIAGNOSIS — H90.12 CONDUCTIVE HEARING LOSS OF LEFT EAR WITH UNRESTRICTED HEARING OF RIGHT EAR: ICD-10-CM

## 2025-01-23 DIAGNOSIS — R09.81 NASAL CONGESTION: ICD-10-CM

## 2025-01-23 DIAGNOSIS — H90.12 CONDUCTIVE HEARING LOSS OF LEFT EAR WITH UNRESTRICTED HEARING OF RIGHT EAR: Primary | ICD-10-CM

## 2025-01-23 PROCEDURE — 3008F BODY MASS INDEX DOCD: CPT | Performed by: OTOLARYNGOLOGY

## 2025-01-23 PROCEDURE — 92550 TYMPANOMETRY & REFLEX THRESH: CPT | Performed by: AUDIOLOGIST

## 2025-01-23 PROCEDURE — 99214 OFFICE O/P EST MOD 30 MIN: CPT | Performed by: OTOLARYNGOLOGY

## 2025-01-23 PROCEDURE — 1159F MED LIST DOCD IN RCRD: CPT | Performed by: OTOLARYNGOLOGY

## 2025-01-23 PROCEDURE — 1036F TOBACCO NON-USER: CPT | Performed by: OTOLARYNGOLOGY

## 2025-01-23 PROCEDURE — 92557 COMPREHENSIVE HEARING TEST: CPT | Performed by: AUDIOLOGIST

## 2025-01-23 RX ORDER — FLUTICASONE PROPIONATE 50 MCG
SPRAY, SUSPENSION (ML) NASAL
Qty: 1 ML | Refills: 11 | Status: SHIPPED | OUTPATIENT
Start: 2025-01-23

## 2025-01-23 NOTE — PROGRESS NOTES
"Chief Complaint   Patient presents with    Follow-up     LOV 5/24 F/U AUDIO IN CHART     HPI:  Angela Samuel is a 70 y.o. female who presents today for evaluation of her left ear.  History of left-sided eustachian dysfunction middle ear effusion which required myringotomy tube in the past.    It has been out.    She has a 2-month history starting in November 2024 of having plugged up again after an upper respiratory infection.  Actually feeling better not really bothering her notch.    Audiogram today shows normal hearing on the right.  Very mild low frequency conductive hearing on the left with a type C tympanogram.    She has a history of a left tube placed by Dr. Wood on November 10, 2023 for effusion and plugged ear.  This was her third left myringotomy tube.    PMH:  History reviewed. No pertinent past medical history.  History reviewed. No pertinent surgical history.      Medications:     Current Outpatient Medications:     calcium carbonate 600 mg calcium (1,500 mg) tablet, Take 1 tablet (600 mg) by mouth 2 times a day with meals., Disp: , Rfl:     cholecalciferol (Vitamin D-3) 50 MCG (2000 UT) tablet, Take 1 tablet (50 mcg) by mouth once daily., Disp: , Rfl:     ibuprofen (AdviL) 200 mg tablet, Take 1 tablet (200 mg) by mouth 3 times daily (morning, midday, late afternoon)., Disp: , Rfl:     fluticasone (Flonase) 50 mcg/actuation nasal spray, 2 sprays each nostril once daily, 1 bottle, 11 refills, Disp: 1 mL, Rfl: 11     Allergies:  No Known Allergies     ROS:  Review of systems normal unless stated otherwise in the HPI and/or PMH.    Physical Exam:  Temperature 36.2 °C (97.1 °F), height 1.575 m (5' 2\"), weight 54.9 kg (121 lb). Body mass index is 22.13 kg/m².     GENERAL APPEARANCE: Well developed and well nourished.  Alert and oriented in no acute distress.  Normal vocal quality.      HEAD/FACE: No erythema or edema or facial tenderness.  Normal facial nerve function bilaterally.    EAR:       " "EXTERNAL: Normal pinnas and external auditory canals without lesion or obstructing wax.       MIDDLE EAR: Tympanic membranes intact.  Left side with anterior traction what appears to be a mucoid effusion.       TUBE STATUS: N/A       MASTOID CAVITY: N/A       HEARING: Gross hearing assessment is within normal limits.      NOSE:       VISUALIZED USING: Anterior rhinoscopy with headlight and nasal speculum.       DORSUM: Midline, nontraumatic appearance.       MUCOSA: Normal-appearing.       SECRETIONS: Normal.       SEPTUM: Midline and nonobstructing.       INFERIOR TURBINATES: Normal.       MIDDLE TURBINATES/MEATUS: N/A       BLEEDING: N/A         ORAL CAVITY/PHARYNX:       TEETH: Adequate dentition.       TONGUE: No mass or lesion.  Normal mobility.       FLOOR OF MOUTH: No mass or lesion.       PALATE: Normal hard palate, soft palate, and uvula.       OROPHARYNX: Normal without mass or lesion.  Tonsils absent       BUCCAL MUCOSA/GBS: Normal without mass or lesion.       LIPS: Normal.    LARYNX/HYPOPHARYNX/NASOPHARYNX: N/A    NECK: No palpable masses or abnormal adenopathy.  Trachea is midline.    THYROID: No thyromegaly or palpable nodule.    SALIVARY GLANDS: Normal bilateral parotid and submandibular glands by inspection and palpation.    TMJ's: Normal.    NEURO: Cranial nerve exam grossly normal bilaterally.         Assessment/Plan   Angela \"Arelis\" was seen today for follow-up.  Diagnoses and all orders for this visit:  Dysfunction of left eustachian tube (Primary)  Conductive hearing loss of left ear with unrestricted hearing of right ear  Nasal congestion  -     fluticasone (Flonase) 50 mcg/actuation nasal spray; 2 sprays each nostril once daily, 1 bottle, 11 refills    Again having left middle ear effusion.  About 2 months now after upper respiratory infection.  She does have chronic eustachian tube dysfunction on the side.  Fortunately not really bothering her too much and she does only have very mild " low-frequency conductive hearing loss on the left-hand side.  Recommend Flonase, time, and recheck in 3 months.  She is aware if not getting better or worsens she may need a another tube.  Follow up in about 3 months (around 4/23/2025) for Recheck.     Timothy Betancur MD

## 2025-01-23 NOTE — PROGRESS NOTES
AUDIOLOGY ADULT AUDIOMETRIC EVALUATION    Name:  Angela Samuel  :  1955  Age:  70 y.o.  Date of Evaluation:  2025    Reason for visit: Ms. Samuel is seen in the clinic today at the request of Timothy Betancur MD in otolaryngology for an audiologic evaluation.     HISTORY  The patient has a history of left-sided eustachian tube dysfunction.  She has had a tube in her left ear in the past.  She reported that she still feels like she has fluid in her left ear.      EVALUATION  See scanned audiogram: “Media” > “Audiology Report”.      RESULTS  Otoscopic Evaluation:  Right Ear: clear ear canal  Left Ear: clear ear canal    Immittance Measures:  Tympanometry:  Right Ear: Type A, normal tympanic membrane mobility with normal middle ear pressure   Left Ear: Type C, normal tympanic membrane mobility with significantly negative middle ear pressure     Acoustic Reflexes:  Ipsilateral Right Ear: Present at normal levels at 500 and 1000 Hz, absent at 2000 and 4000 Hz   Ipsilateral Left Ear: Could not evaluate since an adequate seal could not be maintained    Contralateral Right Ear: did not evaluate  Contralateral Left Ear: did not evaluate    Distortion Product Otoacoustic Emissions (DPOAEs):  Right Ear: did not evaluate   Left Ear: did not evaluate     Audiometry:  Test Technique and Reliability:   Standard audiometry via supra-aural headphones. Reliability is good.    Pure tone air and bone conduction audiometry:  Right Ear: normal hearing   Left Ear: normal hearing except for a mild low frequency conductive hearing loss     Speech Audiometry (Word Recognition Scores):   Right Ear: Excellent, 100%   Left Ear: Excellent, 96%     IMPRESSIONS  Results of today's audiometric evaluation revealed normal hearing in the right ear and normal hearing with a mild low frequency conductive hearing loss in the left ear.  Results of tympanometry testing indicated normal middle ear function in the right ear  and significantly negative middle ear pressure with normal tympanic membrane mobility in the left ear.     The presence of acoustic reflexes within normal intensity limits is consistent with normal middle ear and brainstem function, and suggests that auditory sensitivity is not significantly impaired. An elevated or absent acoustic reflex threshold is consistent with a middle ear disorder, hearing loss in the stimulated ear, and/or interruption of neural innervation of the stapedius muscle.    RECOMMENDATIONS  - Follow up with otolaryngology today as scheduled.  - Audiologic evaluation in conjunction with otologic care or if an acute change is noted.   - Follow-up with medical care team as planned.    PATIENT EDUCATION  Discussed results, impressions and recommendations with the patient. Questions were addressed and the patient was encouraged to contact our office should concerns arise.    Time for this encounter: 2:30-3:00    Cher Ross M.A., CCC-A   Licensed Audiologist

## 2025-04-12 DIAGNOSIS — R09.81 NASAL CONGESTION: ICD-10-CM

## 2025-04-12 LAB
25(OH)D3+25(OH)D2 SERPL-MCNC: 36 NG/ML (ref 30–100)
ALBUMIN SERPL-MCNC: 5 G/DL (ref 3.6–5.1)
ALP SERPL-CCNC: 68 U/L (ref 37–153)
ALT SERPL-CCNC: 13 U/L (ref 6–29)
ANION GAP SERPL CALCULATED.4IONS-SCNC: 10 MMOL/L (CALC) (ref 7–17)
AST SERPL-CCNC: 17 U/L (ref 10–35)
BASOPHILS # BLD AUTO: 32 CELLS/UL (ref 0–200)
BASOPHILS NFR BLD AUTO: 0.7 %
BILIRUB SERPL-MCNC: 0.4 MG/DL (ref 0.2–1.2)
BUN SERPL-MCNC: 11 MG/DL (ref 7–25)
CALCIUM SERPL-MCNC: 9.7 MG/DL (ref 8.6–10.4)
CHLORIDE SERPL-SCNC: 102 MMOL/L (ref 98–110)
CHOLEST SERPL-MCNC: 293 MG/DL
CHOLEST/HDLC SERPL: 5.1 (CALC)
CO2 SERPL-SCNC: 28 MMOL/L (ref 20–32)
CREAT SERPL-MCNC: 0.73 MG/DL (ref 0.6–1)
EGFRCR SERPLBLD CKD-EPI 2021: 88 ML/MIN/1.73M2
EOSINOPHIL # BLD AUTO: 129 CELLS/UL (ref 15–500)
EOSINOPHIL NFR BLD AUTO: 2.8 %
ERYTHROCYTE [DISTWIDTH] IN BLOOD BY AUTOMATED COUNT: 12.8 % (ref 11–15)
EST. AVERAGE GLUCOSE BLD GHB EST-MCNC: 117 MG/DL
EST. AVERAGE GLUCOSE BLD GHB EST-SCNC: 6.5 MMOL/L
GLUCOSE SERPL-MCNC: 105 MG/DL (ref 65–99)
HBA1C MFR BLD: 5.7 % OF TOTAL HGB
HCT VFR BLD AUTO: 42.6 % (ref 35–45)
HDLC SERPL-MCNC: 58 MG/DL
HGB BLD-MCNC: 13.9 G/DL (ref 11.7–15.5)
LDLC SERPL CALC-MCNC: 195 MG/DL (CALC)
LYMPHOCYTES # BLD AUTO: 1638 CELLS/UL (ref 850–3900)
LYMPHOCYTES NFR BLD AUTO: 35.6 %
MCH RBC QN AUTO: 29 PG (ref 27–33)
MCHC RBC AUTO-ENTMCNC: 32.6 G/DL (ref 32–36)
MCV RBC AUTO: 88.8 FL (ref 80–100)
MONOCYTES # BLD AUTO: 340 CELLS/UL (ref 200–950)
MONOCYTES NFR BLD AUTO: 7.4 %
NEUTROPHILS # BLD AUTO: 2461 CELLS/UL (ref 1500–7800)
NEUTROPHILS NFR BLD AUTO: 53.5 %
NONHDLC SERPL-MCNC: 235 MG/DL (CALC)
PLATELET # BLD AUTO: 319 THOUSAND/UL (ref 140–400)
PMV BLD REES-ECKER: 9.2 FL (ref 7.5–12.5)
POTASSIUM SERPL-SCNC: 4.2 MMOL/L (ref 3.5–5.3)
PROT SERPL-MCNC: 7.3 G/DL (ref 6.1–8.1)
RBC # BLD AUTO: 4.8 MILLION/UL (ref 3.8–5.1)
SODIUM SERPL-SCNC: 140 MMOL/L (ref 135–146)
TRIGL SERPL-MCNC: 213 MG/DL
TSH SERPL-ACNC: 4.49 MIU/L (ref 0.4–4.5)
WBC # BLD AUTO: 4.6 THOUSAND/UL (ref 3.8–10.8)

## 2025-04-14 RX ORDER — FLUTICASONE PROPIONATE 50 MCG
SPRAY, SUSPENSION (ML) NASAL
Qty: 48 G | Refills: 3 | Status: SHIPPED | OUTPATIENT
Start: 2025-04-14

## 2025-04-21 ASSESSMENT — ENCOUNTER SYMPTOMS
COUGH: 0
NAUSEA: 0
CHILLS: 0
VOMITING: 0
FEVER: 0
HEADACHES: 0
SHORTNESS OF BREATH: 0
ABDOMINAL PAIN: 0
APPETITE CHANGE: 0
DIARRHEA: 0

## 2025-04-21 NOTE — PROGRESS NOTES
[facility]: [facility]  MEDICARE WELLNESS EXAM      Angela Samuel is a 70 y.o. female that is presenting today for Medicare Annual Wellness Visit Subsequent.    Assessment/Plan    Diagnoses and all orders for this visit:  Annual physical exam  Subclinical hypothyroidism  Osteoporosis, unspecified osteoporosis type, unspecified pathological fracture presence  Hyperlipidemia, unspecified hyperlipidemia type  Encounter for screening for osteoporosis  -     XR DEXA bone density; Future  Menopause  -     XR DEXA bone density; Future  Other orders  -     Follow Up In Primary Care - Health Maintenance  -     Follow Up In Primary Care - Medicare Annual; Future    HPL elevated as in the past.  CAC 0.  Discussed the high LDL and the fluctuation she has had over the years.  She would prefer not to take a statin.  Given the calcium score of 0 last of other risk factors would not push statin therapy and primary prevention.  She expresses understanding and agreement with the plan.    Reviewed screening and prevention schedule.  Mammogram and bone density test ordered and the colonoscopy is up-to-date.    ADVANCED CARE PLANNING  Advanced Care Planning was discussed with patient:  The patient has an active advanced care plan on file. The patient has an active surrogate decision-maker on file.  Encouraged the patient to confirm that Living Will and Healthcare Power of  (HCPoA) are accurate and up to date.  Encouraged the patient to confirm that our office be provided a copy of any documentation in the event that anything changes.    ACTIVITIES OF DAILY LIVING  Basic ADLs:  Bathing: Independent, Dressing: Independent, Toileting: Independent, Transferring: Independent, Continence: Independent, Feeding: Independent.    Instrumental ADLs:  Ability to use phone: Independent, Shopping: Independent, Cooking: Independent, House-keeping: Independent, Laundry: Independent, Transportation: Independent,  Medication Management: Independent, Finance Management: Independent.    Subjective   HPI  This patient presents today for annual physical, Medicare wellness exam.  Discussed screening/prevention, healthy lifestyle and code status.   Reviewed the patient's wishes regarding decision making.    Consultant visits and notes reviewed: ENT Gaugler 5/2024    Stable from a functional standpoint in regard to ADLs and IADLs.  No recent falls are reported.    The patient denies chest pain and shortness of breath.  No exertion-provoked or anginal-type symptoms are reported.    Overall doing well and no complaints at all.  No chest pain, no shortness of breath.  Bowels working normally.  No significant joint pain.  Remains quite active.  Diet is very good.    Review of Systems   Constitutional:  Negative for appetite change, chills and fever.   Respiratory:  Negative for cough and shortness of breath.    Cardiovascular:  Negative for chest pain.   Gastrointestinal:  Negative for abdominal pain, diarrhea, nausea and vomiting.   Neurological:  Negative for headaches.   All other systems reviewed and are negative.    Objective   Vitals:    04/22/25 0816   BP: 136/74   Pulse: 73   Temp: 35.9 °C (96.7 °F)   SpO2: 98%      Body mass index is 21.03 kg/m².  Physical Exam  Vitals reviewed.   Constitutional:       General: She is not in acute distress.     Appearance: She is not toxic-appearing.   HENT:      Head: Normocephalic and atraumatic.      Mouth/Throat:      Mouth: Mucous membranes are moist.   Eyes:      Pupils: Pupils are equal, round, and reactive to light.   Cardiovascular:      Rate and Rhythm: Normal rate and regular rhythm.      Heart sounds: No murmur heard.  Pulmonary:      Breath sounds: Normal breath sounds. No wheezing, rhonchi or rales.   Abdominal:      General: There is no distension.      Palpations: Abdomen is soft.   Musculoskeletal:      Right lower leg: No edema.      Left lower leg: No edema.   Neurological:  "     General: No focal deficit present.      Mental Status: She is alert and oriented to person, place, and time.     Diagnostic Results   Lab Results   Component Value Date    GLUCOSE 105 (H) 04/11/2025    CALCIUM 9.7 04/11/2025     04/11/2025    K 4.2 04/11/2025    CO2 28 04/11/2025     04/11/2025    BUN 11 04/11/2025    CREATININE 0.73 04/11/2025     Lab Results   Component Value Date    ALT 13 04/11/2025    AST 17 04/11/2025    ALKPHOS 68 04/11/2025    BILITOT 0.4 04/11/2025     Lab Results   Component Value Date    WBC 4.6 04/11/2025    HGB 13.9 04/11/2025    HCT 42.6 04/11/2025    MCV 88.8 04/11/2025     04/11/2025     Lab Results   Component Value Date    CHOL 293 (H) 04/11/2025    CHOL 255 (H) 04/08/2024    CHOL 277 (H) 09/27/2023     Lab Results   Component Value Date    HDL 58 04/11/2025    HDL 51.0 04/08/2024    HDL 58 09/27/2023     Lab Results   Component Value Date    LDLCALC 195 (H) 04/11/2025    LDLCALC 147 (H) 04/08/2024    LDLCALC 181 (H) 09/27/2023     Lab Results   Component Value Date    TRIG 213 (H) 04/11/2025    TRIG 286 (H) 04/08/2024    TRIG 190 (H) 09/27/2023     No components found for: \"CHOLHDL\"  Lab Results   Component Value Date    HGBA1C 5.7 (H) 04/11/2025     Other labs not included in the list above reviewed either before or during this encounter.    History   Medical History[1]  Surgical History[2]  Family History[3]  Social History     Socioeconomic History   • Marital status:      Spouse name: Not on file   • Number of children: Not on file   • Years of education: Not on file   • Highest education level: Not on file   Occupational History   • Not on file   Tobacco Use   • Smoking status: Never   • Smokeless tobacco: Never   Vaping Use   • Vaping status: Never Used   Substance and Sexual Activity   • Alcohol use: Yes     Comment: social   • Drug use: Never   • Sexual activity: Yes     Partners: Male     Birth control/protection: None   Other Topics Concern "   • Not on file   Social History Narrative   • Not on file     Social Drivers of Health     Financial Resource Strain: Not on file   Food Insecurity: Not on file   Transportation Needs: Not on file   Physical Activity: Not on file   Stress: Not on file   Social Connections: Not on file   Intimate Partner Violence: Not on file   Housing Stability: Not on file     Allergies[4]  Medications Ordered Prior to Encounter[5]  Immunization History   Administered Date(s) Administered   • COVID-19, mRNA, LNP-S, PF, 30 mcg/0.3 mL dose 03/23/2021, 09/29/2021   • DTaP vaccine, pediatric  (INFANRIX) 05/09/2012   • Flu vaccine, quadrivalent, high-dose, preservative free, age 65y+ (FLUZONE) 08/18/2020, 09/20/2021, 10/04/2022, 09/12/2023   • Flu vaccine, trivalent, preservative free, HIGH-DOSE, age 65y+ (Fluzone) 09/13/2019, 09/06/2024   • Pfizer Gray Cap SARS-CoV-2 05/29/2022   • Pfizer Purple Cap SARS-CoV-2 03/02/2021   • Pneumococcal conjugate vaccine, 13-valent (PREVNAR 13) 02/25/2019   • Pneumococcal polysaccharide vaccine, 23-valent, age 2 years and older (PNEUMOVAX 23) 02/27/2020   • Zoster vaccine, recombinant, adult (SHINGRIX) 05/16/2019, 07/19/2019   • Zoster, live 11/01/2017     Patient's medical history was reviewed and updated either before or during this encounter.     Catsro Mackenzie MD           [1]  History reviewed. No pertinent past medical history.  [2]  History reviewed. No pertinent surgical history.  [3]  Family History  Problem Relation Name Age of Onset   • Diabetes Mother Rachel Nguyen    • Hypertension Mother Rachel Nguyen    • Kidney disease Mother Rachel Nguyen    • No Known Problems Father     • Breast cancer Sister Stella 40   [4]  No Known Allergies  [5]  Current Outpatient Medications on File Prior to Visit   Medication Sig Dispense Refill   • calcium carbonate 600 mg calcium (1,500 mg) tablet Take 1 tablet (600 mg) by mouth 2 times a day with meals.     • cholecalciferol (Vitamin D-3) 50 MCG  (2000 UT) tablet Take 1 tablet (50 mcg) by mouth once daily.     • fluticasone (Flonase) 50 mcg/actuation nasal spray  48 g 3   • ibuprofen (AdviL) 200 mg tablet Take 1 tablet (200 mg) by mouth 3 times daily (morning, midday, late afternoon).       No current facility-administered medications on file prior to visit.

## 2025-04-22 ENCOUNTER — OFFICE VISIT (OUTPATIENT)
Dept: PRIMARY CARE | Facility: CLINIC | Age: 70
End: 2025-04-22
Payer: MEDICARE

## 2025-04-22 VITALS
DIASTOLIC BLOOD PRESSURE: 74 MMHG | TEMPERATURE: 96.7 F | HEIGHT: 62 IN | BODY MASS INDEX: 21.16 KG/M2 | WEIGHT: 115 LBS | OXYGEN SATURATION: 98 % | HEART RATE: 73 BPM | SYSTOLIC BLOOD PRESSURE: 136 MMHG

## 2025-04-22 DIAGNOSIS — Z78.0 MENOPAUSE: ICD-10-CM

## 2025-04-22 DIAGNOSIS — E03.8 SUBCLINICAL HYPOTHYROIDISM: ICD-10-CM

## 2025-04-22 DIAGNOSIS — Z00.00 ANNUAL PHYSICAL EXAM: Primary | ICD-10-CM

## 2025-04-22 DIAGNOSIS — Z13.820 ENCOUNTER FOR SCREENING FOR OSTEOPOROSIS: ICD-10-CM

## 2025-04-22 DIAGNOSIS — E78.5 HYPERLIPIDEMIA, UNSPECIFIED HYPERLIPIDEMIA TYPE: ICD-10-CM

## 2025-04-22 DIAGNOSIS — M81.0 OSTEOPOROSIS, UNSPECIFIED OSTEOPOROSIS TYPE, UNSPECIFIED PATHOLOGICAL FRACTURE PRESENCE: ICD-10-CM

## 2025-04-22 PROCEDURE — 3008F BODY MASS INDEX DOCD: CPT | Performed by: INTERNAL MEDICINE

## 2025-04-22 PROCEDURE — 1159F MED LIST DOCD IN RCRD: CPT | Performed by: INTERNAL MEDICINE

## 2025-04-22 PROCEDURE — 1036F TOBACCO NON-USER: CPT | Performed by: INTERNAL MEDICINE

## 2025-04-22 PROCEDURE — 99215 OFFICE O/P EST HI 40 MIN: CPT | Performed by: INTERNAL MEDICINE

## 2025-04-22 PROCEDURE — 1126F AMNT PAIN NOTED NONE PRSNT: CPT | Performed by: INTERNAL MEDICINE

## 2025-04-22 PROCEDURE — G0439 PPPS, SUBSEQ VISIT: HCPCS | Performed by: INTERNAL MEDICINE

## 2025-04-22 ASSESSMENT — PAIN SCALES - GENERAL: PAINLEVEL_OUTOF10: 0-NO PAIN

## 2025-04-24 ENCOUNTER — APPOINTMENT (OUTPATIENT)
Dept: OTOLARYNGOLOGY | Facility: CLINIC | Age: 70
End: 2025-04-24
Payer: MEDICARE

## 2025-04-24 VITALS — WEIGHT: 114 LBS | BODY MASS INDEX: 20.98 KG/M2 | HEIGHT: 62 IN

## 2025-04-24 DIAGNOSIS — H69.92 DYSFUNCTION OF LEFT EUSTACHIAN TUBE: Primary | ICD-10-CM

## 2025-04-24 DIAGNOSIS — H90.12 CONDUCTIVE HEARING LOSS OF LEFT EAR WITH UNRESTRICTED HEARING OF RIGHT EAR: ICD-10-CM

## 2025-04-24 DIAGNOSIS — H65.499 CHRONIC OTITIS MEDIA WITH EFFUSION, UNSPECIFIED LATERALITY: ICD-10-CM

## 2025-04-24 DIAGNOSIS — H90.3 BILATERAL SENSORINEURAL HEARING LOSS: ICD-10-CM

## 2025-04-24 PROCEDURE — 69433 CREATE EARDRUM OPENING: CPT | Performed by: OTOLARYNGOLOGY

## 2025-04-24 PROCEDURE — 1159F MED LIST DOCD IN RCRD: CPT | Performed by: OTOLARYNGOLOGY

## 2025-04-24 PROCEDURE — 31231 NASAL ENDOSCOPY DX: CPT | Performed by: OTOLARYNGOLOGY

## 2025-04-24 PROCEDURE — 99214 OFFICE O/P EST MOD 30 MIN: CPT | Performed by: OTOLARYNGOLOGY

## 2025-04-24 PROCEDURE — 3008F BODY MASS INDEX DOCD: CPT | Performed by: OTOLARYNGOLOGY

## 2025-04-24 PROCEDURE — 1036F TOBACCO NON-USER: CPT | Performed by: OTOLARYNGOLOGY

## 2025-04-24 NOTE — PROGRESS NOTES
"Chief Complaint   Patient presents with    Follow-up     LOV: 1/2025 LEFT ETD, USING FLONASE, DOING BETTER     HPI:  Angela Samuel is a 70 y.o. female who presents today in 3-month follow-up for evaluation of her left ear and middle ear effusion.  History of left-sided eustachian dysfunction middle ear effusion which required myringotomy tube in the past - x3.    The last has been out since at least May 2024.    She has a 2-month history starting in November 2024 of having plugged up again after an upper respiratory infection.  Actually feeling better not really bothering her notch.    Audiogram January 2025 shows normal hearing on the right.  Very mild low frequency conductive hearing on the left with a type C tympanogram.    Her last left tube was placed by Dr. Wood on November 10, 2023 for effusion and plugged ear.     PMH:  History reviewed. No pertinent past medical history.  History reviewed. No pertinent surgical history.      Medications:     Current Outpatient Medications:     calcium carbonate 600 mg calcium (1,500 mg) tablet, Take 1 tablet (600 mg) by mouth 2 times a day with meals., Disp: , Rfl:     cholecalciferol (Vitamin D-3) 50 MCG (2000 UT) tablet, Take 1 tablet (50 mcg) by mouth once daily., Disp: , Rfl:     fluticasone (Flonase) 50 mcg/actuation nasal spray, , Disp: 48 g, Rfl: 3    ibuprofen (AdviL) 200 mg tablet, Take 1 tablet (200 mg) by mouth 3 times daily (morning, midday, late afternoon)., Disp: , Rfl:      Allergies:  No Known Allergies     ROS:  Review of systems normal unless stated otherwise in the HPI and/or PMH.    Physical Exam:  Height 1.575 m (5' 2\"), weight 51.7 kg (114 lb). Body mass index is 20.85 kg/m².     GENERAL APPEARANCE: Well developed and well nourished.  Alert and oriented in no acute distress.  Normal vocal quality.      HEAD/FACE: No erythema or edema or facial tenderness.  Normal facial nerve function bilaterally.    EAR:       EXTERNAL: Normal pinnas and " external auditory canals without lesion or obstructing wax.       MIDDLE EAR: Tympanic membranes intact.  Left side with anterior traction with what appears to be a mucoid effusion.       TUBE STATUS: N/A       MASTOID CAVITY: N/A       HEARING: Gross hearing assessment is within normal limits.      NOSE:       VISUALIZED USING: Anterior rhinoscopy with headlight and nasal speculum.  Scoping performed       DORSUM: Midline, nontraumatic appearance.       MUCOSA: Normal-appearing.       SECRETIONS: Normal.       SEPTUM: Midline and nonobstructing.       INFERIOR TURBINATES: Normal.       MIDDLE TURBINATES/MEATUS: Normal bilateral       BLEEDING: N/A         ORAL CAVITY/PHARYNX:       TEETH: Adequate dentition.       TONGUE: No mass or lesion.  Normal mobility.       FLOOR OF MOUTH: No mass or lesion.       PALATE: Normal hard palate, soft palate, and uvula.       OROPHARYNX: Normal without mass or lesion.  Tonsils absent       BUCCAL MUCOSA/GBS: Normal without mass or lesion.       LIPS: Normal.    LARYNX/HYPOPHARYNX/NASOPHARYNX: Flexible laryngoscopy was performed after consent secondary to an inadequate mirror examination.  The flexible laryngoscope was placed through the nasal cavity revealing normal nasopharynx, normal oropharynx, normal hypopharynx, and normal larynx.  There is normal bilateral vocal cord mobility without any mucosal masses or lesions.    NECK: No palpable masses or abnormal adenopathy.  Trachea is midline.    THYROID: No thyromegaly or palpable nodule.    SALIVARY GLANDS: Normal bilateral parotid and submandibular glands by inspection and palpation.    TMJ's: Normal.    NEURO: Cranial nerve exam grossly normal bilaterally.         Procedure:  Myringotomy with tube  Diagnosis left eustachian tube dysfunction, conductive hearing loss, chronic otitis media with effusion  Woke anesthesia of the Astero canal with 1% Xylocaine with epinephrine  No bleeding, specimen, complication  The patient was  "made aware the procedure including the postoperative care and the risk such as not limited to bleeding, infection, perforation, hearing loss, the need for further surgery in the future.  Consent was granted.  The ear was examined using operating microscope.  Ear canal was injected with 1% Xylocaine with epinephrine in a standard fashion small inferior myringotomy incision was made with the finding of a mucous effusion.  Suction was used to remove this.  A T tube was placed without difficulty or problems.    Assessment/Plan   Angela \"Arelis\" was seen today for follow-up.  Diagnoses and all orders for this visit:  Dysfunction of left eustachian tube (Primary)  Chronic otitis media with effusion, unspecified laterality  Conductive hearing loss of left ear with unrestricted hearing of right ear  Bilateral sensorineural hearing loss    Continued left chronic otitis media effusion.  Negative nasopharyngoscopy.  Discussed with her placing a T-tube to try to stay in longer as this has been a chronic and recurrent problem for her.  She is aware the procedure including the risk such as not limited to bleeding, infection, perforation, need for further surgery, hearing loss.  She does wish to proceed and this was done without any complication.  Follow up in about 4 months (around 8/24/2025) for audiogram, Recheck, sooner with changes or worsening symptoms.     Timothy Betancur MD    "

## 2025-05-08 ENCOUNTER — PATIENT MESSAGE (OUTPATIENT)
Dept: OTOLARYNGOLOGY | Facility: CLINIC | Age: 70
End: 2025-05-08
Payer: MEDICARE

## 2025-05-08 DIAGNOSIS — R09.81 NASAL CONGESTION: ICD-10-CM

## 2025-05-08 RX ORDER — FLUTICASONE PROPIONATE 50 MCG
SPRAY, SUSPENSION (ML) NASAL
Qty: 48 G | Refills: 3 | Status: SHIPPED | OUTPATIENT
Start: 2025-05-08

## 2025-05-29 ENCOUNTER — HOSPITAL ENCOUNTER (OUTPATIENT)
Dept: RADIOLOGY | Facility: CLINIC | Age: 70
Discharge: HOME | End: 2025-05-29
Payer: MEDICARE

## 2025-05-29 VITALS — WEIGHT: 113 LBS | BODY MASS INDEX: 20.8 KG/M2 | HEIGHT: 62 IN

## 2025-05-29 DIAGNOSIS — Z12.31 SCREENING MAMMOGRAM FOR BREAST CANCER: ICD-10-CM

## 2025-05-29 DIAGNOSIS — Z13.820 ENCOUNTER FOR SCREENING FOR OSTEOPOROSIS: ICD-10-CM

## 2025-05-29 DIAGNOSIS — Z78.0 MENOPAUSE: ICD-10-CM

## 2025-05-29 PROCEDURE — 77067 SCR MAMMO BI INCL CAD: CPT | Performed by: RADIOLOGY

## 2025-05-29 PROCEDURE — 77063 BREAST TOMOSYNTHESIS BI: CPT | Performed by: RADIOLOGY

## 2025-05-29 PROCEDURE — 77080 DXA BONE DENSITY AXIAL: CPT

## 2025-05-29 PROCEDURE — 77080 DXA BONE DENSITY AXIAL: CPT | Performed by: RADIOLOGY

## 2025-05-29 PROCEDURE — 77063 BREAST TOMOSYNTHESIS BI: CPT

## 2025-06-16 DIAGNOSIS — R31.9 HEMATURIA, UNSPECIFIED TYPE: Primary | ICD-10-CM

## 2025-06-17 ENCOUNTER — TELEPHONE (OUTPATIENT)
Dept: PRIMARY CARE | Facility: CLINIC | Age: 70
End: 2025-06-17
Payer: MEDICARE

## 2025-06-17 NOTE — TELEPHONE ENCOUNTER
----- Message from Castro Mackenzie sent at 6/16/2025  6:21 PM EDT -----  I think we should have her see the gynecologist and see if she can recommend anything else as far as the dryness.  I would like to get a urinalysis rechecked in a couple weeks to make sure we are   still not dealing with blood in the urine.  If we are, that would warrant further evaluation.  I placed another urinalysis order for that time.  ----- Message -----  From: Josefina Gtz LPN  Sent: 6/16/2025   3:21 PM EDT  To: Castro Mackenzie MD    Only Friday to 12 am Saturday ,  She feels as though the area was just dry.  IS using a lubricant.  Made appt with gyncologist you recommended   ----- Message -----  From: Castro Mackenzie MD  Sent: 6/16/2025   9:48 AM EDT  To: Josefina Gtz LPN    There is blood in the urine - is she seeing any more blood?  Any other symptoms?  ----- Message -----  From: Uversity Results In  Sent: 6/14/2025   5:47 AM EDT  To: Castro Mackenzie MD

## 2025-06-24 ASSESSMENT — ENCOUNTER SYMPTOMS: ABDOMINAL PAIN: 1

## 2025-06-30 ENCOUNTER — OFFICE VISIT (OUTPATIENT)
Dept: OBSTETRICS AND GYNECOLOGY | Facility: CLINIC | Age: 70
End: 2025-06-30
Payer: MEDICARE

## 2025-06-30 VITALS — DIASTOLIC BLOOD PRESSURE: 62 MMHG | WEIGHT: 112.2 LBS | BODY MASS INDEX: 20.52 KG/M2 | SYSTOLIC BLOOD PRESSURE: 128 MMHG

## 2025-06-30 DIAGNOSIS — N95.0 POST-MENOPAUSAL BLEEDING: Primary | ICD-10-CM

## 2025-06-30 PROCEDURE — 1159F MED LIST DOCD IN RCRD: CPT | Performed by: STUDENT IN AN ORGANIZED HEALTH CARE EDUCATION/TRAINING PROGRAM

## 2025-06-30 PROCEDURE — 1160F RVW MEDS BY RX/DR IN RCRD: CPT | Performed by: STUDENT IN AN ORGANIZED HEALTH CARE EDUCATION/TRAINING PROGRAM

## 2025-06-30 PROCEDURE — 99213 OFFICE O/P EST LOW 20 MIN: CPT | Performed by: STUDENT IN AN ORGANIZED HEALTH CARE EDUCATION/TRAINING PROGRAM

## 2025-06-30 PROCEDURE — 1036F TOBACCO NON-USER: CPT | Performed by: STUDENT IN AN ORGANIZED HEALTH CARE EDUCATION/TRAINING PROGRAM

## 2025-06-30 PROCEDURE — 99203 OFFICE O/P NEW LOW 30 MIN: CPT | Performed by: STUDENT IN AN ORGANIZED HEALTH CARE EDUCATION/TRAINING PROGRAM

## 2025-06-30 PROCEDURE — 1126F AMNT PAIN NOTED NONE PRSNT: CPT | Performed by: STUDENT IN AN ORGANIZED HEALTH CARE EDUCATION/TRAINING PROGRAM

## 2025-06-30 ASSESSMENT — ENCOUNTER SYMPTOMS
LOSS OF SENSATION IN FEET: 0
DEPRESSION: 0
OCCASIONAL FEELINGS OF UNSTEADINESS: 0

## 2025-06-30 ASSESSMENT — LIFESTYLE VARIABLES
HOW OFTEN DO YOU HAVE A DRINK CONTAINING ALCOHOL: MONTHLY OR LESS
HOW MANY STANDARD DRINKS CONTAINING ALCOHOL DO YOU HAVE ON A TYPICAL DAY: 1 OR 2
AUDIT-C TOTAL SCORE: 2
SKIP TO QUESTIONS 9-10: 0
HOW OFTEN DO YOU HAVE SIX OR MORE DRINKS ON ONE OCCASION: LESS THAN MONTHLY

## 2025-06-30 ASSESSMENT — PATIENT HEALTH QUESTIONNAIRE - PHQ9
2. FEELING DOWN, DEPRESSED OR HOPELESS: NOT AT ALL
1. LITTLE INTEREST OR PLEASURE IN DOING THINGS: NOT AT ALL
SUM OF ALL RESPONSES TO PHQ9 QUESTIONS 1 & 2: 0

## 2025-06-30 ASSESSMENT — PAIN SCALES - GENERAL: PAINLEVEL_OUTOF10: 0-NO PAIN

## 2025-06-30 NOTE — PROGRESS NOTES
"GYNECOLOGY OFFICE VISIT   Subjective     History Of Present Illness:    Angela Samuel \"Sammi" is a 70 y.o.  presenting for New Patient Visit and Vaginal Bleeding.    She states that three weeks ago, she had a UTI, was started on antibiotics, and then noticed spotting later that week. She states that it was an isolated episode. She states it was not enough blood to saturate a pad and it was more noticeable when wiping. She has since started a lubricant which she believes has helped.     Review of Systems:  Denies abdominal pain, pelvic pain, nausea, vomiting, urinary frequency, dysuria, hematuria, constipation, hematochezia, abnormal vaginal discharge, or dyspareunia.     Menstrual History:  OB History          5    Para   5    Term   5       0    AB   0    Living   5         SAB   0    IAB   0    Ectopic   0    Multiple   0    Live Births   5               No LMP recorded. Patient has had an ablation.     Last Pap Smear:  Result Date Procedure Results Follow-ups   3/15/2019 CONVERTED GYN CYTOLOGY Pap Smear: NILM        Last Mammogram:  25  Impression  No mammographic evidence of malignancy.  BI-RADS CATEGORY:  BI-RADS Category:  1 Negative.  Recommendation:  Annual Screening.  Recommended Date:  1 Year.  Laterality:  Bilateral.    Last DEXA:  25  DEXA BONE DENSITY  - Impression -  DEXA:  According to World Health Organization criteria,  classification is osteoporosis lumbar spine. Osteopenia left hip.  Followup recommended in two years or sooner as clinically warranted.    Last Colon Cancer Screenin2019- Colonoscopy, repeat in 10 years    Past Medical History:  Medical History[1]    Past Surgical History:  Surgical History[2]      Social History:  Social History     Tobacco Use    Smoking status: Never    Smokeless tobacco: Never   Substance Use Topics    Alcohol use: Yes     Comment: social/very occasional     Family History:  Family History[3]     Allergies:  Patient " has no known allergies.    Outpatient Medications:  Current Outpatient Medications   Medication Instructions    calcium carbonate 600 mg, 2 times daily (morning and late afternoon)    cholecalciferol (VITAMIN D-3) 50 mcg, Daily    fluticasone (Flonase) 50 mcg/actuation nasal spray 2 sprays each nostril once daily, 1 bottle, 11 refills    ibuprofen (ADVIL) 200 mg, 3 times daily (morning, midday, late afternoon)     Objective   Last Recorded Vitals:  Visit Vitals  /62     Physical Exam:  General: Alert and oriented, in no acute distress.  Psych: Normal affect and mood. Able to answer questions appropriately.   Lungs: Non labored respirations.  GYN:  Speculum:  Vulva: Normal architecture without erythema, masses, or lesions.   Vagina: Atrophic mucosa without lesions, masses. No abnormal vaginal discharge.   Cervix: Normal without erythema, masses, lesions, or signs of cervicitis.  Bimanual:   Cervix: No cervical motion tenderness.  Uterus: Normal, mobile, non-enlarged, non tender uterus.  Adnexa: Normal without tenderness, nodularity, masses, or lesions.    Assessment/Plan     70 y.o.  , here for an episode of post menopausal spotting  Assessment & Plan  Post-menopausal bleeding  -Discussed pathophysiology of abnormal bleeding after menopause   -Discussed possible etiologies of post menopausal bleeding including benign causes, hyperplasia, endometrial cancer   -Discussed that would recommend a pelvic US to measure endometrial thickness  -Discussed possible next steps including endometrial biopsy/endometrial tissue sampling  -Patient agreeable to obtaining a pelvic US for further evaluation  -Follow up after US results    Orders:    US PELVIS TRANSABDOMINAL WITH TRANSVAGINAL; Future      Sujatha Miner MD         [1]   Past Medical History:  Diagnosis Date    Ear problems     Eustachian tube dysfunction     Varicella    [2]   Past Surgical History:  Procedure Laterality Date    COLPOSCOPY      ENDOMETRIAL  ABLATION     [3]   Family History  Problem Relation Name Age of Onset    Diabetes Mother Rachel Nguyen     Hypertension Mother Rachel Nguyen     Kidney disease Mother Rachel Nguyen     No Known Problems Father      Breast cancer Sister Stella Moon

## 2025-07-01 ENCOUNTER — HOSPITAL ENCOUNTER (OUTPATIENT)
Dept: RADIOLOGY | Facility: CLINIC | Age: 70
Discharge: HOME | End: 2025-07-01
Payer: MEDICARE

## 2025-07-01 DIAGNOSIS — N95.0 POST-MENOPAUSAL BLEEDING: ICD-10-CM

## 2025-07-01 LAB
APPEARANCE UR: CLEAR
BILIRUB UR QL STRIP: NEGATIVE
COLOR UR: YELLOW
GLUCOSE UR QL STRIP: NEGATIVE
HGB UR QL STRIP: NEGATIVE
KETONES UR QL STRIP: NEGATIVE
LEUKOCYTE ESTERASE UR QL STRIP: NEGATIVE
NITRITE UR QL STRIP: NEGATIVE
PH UR STRIP: 7 [PH] (ref 5–8)
PROT UR QL STRIP: NEGATIVE
SP GR UR STRIP: 1 (ref 1–1.03)

## 2025-07-01 PROCEDURE — 76857 US EXAM PELVIC LIMITED: CPT | Performed by: STUDENT IN AN ORGANIZED HEALTH CARE EDUCATION/TRAINING PROGRAM

## 2025-07-01 PROCEDURE — 76830 TRANSVAGINAL US NON-OB: CPT | Performed by: STUDENT IN AN ORGANIZED HEALTH CARE EDUCATION/TRAINING PROGRAM

## 2025-07-01 PROCEDURE — 76830 TRANSVAGINAL US NON-OB: CPT

## 2025-07-14 ENCOUNTER — APPOINTMENT (OUTPATIENT)
Dept: OBSTETRICS AND GYNECOLOGY | Facility: CLINIC | Age: 70
End: 2025-07-14
Payer: MEDICARE

## 2025-07-17 ENCOUNTER — APPOINTMENT (OUTPATIENT)
Dept: OTOLARYNGOLOGY | Facility: CLINIC | Age: 70
End: 2025-07-17
Payer: MEDICARE

## 2025-07-17 ENCOUNTER — APPOINTMENT (OUTPATIENT)
Dept: AUDIOLOGY | Facility: CLINIC | Age: 70
End: 2025-07-17
Payer: MEDICARE

## 2025-07-17 VITALS — WEIGHT: 112 LBS | TEMPERATURE: 97.3 F | HEIGHT: 62 IN | BODY MASS INDEX: 20.61 KG/M2

## 2025-07-17 DIAGNOSIS — H69.92 DYSFUNCTION OF LEFT EUSTACHIAN TUBE: Primary | ICD-10-CM

## 2025-07-17 DIAGNOSIS — H65.499 CHRONIC OTITIS MEDIA WITH EFFUSION, UNSPECIFIED LATERALITY: ICD-10-CM

## 2025-07-17 DIAGNOSIS — H90.3 BILATERAL SENSORINEURAL HEARING LOSS: ICD-10-CM

## 2025-07-17 DIAGNOSIS — Z96.22 MYRINGOTOMY TUBE(S) STATUS: ICD-10-CM

## 2025-07-17 PROCEDURE — 92567 TYMPANOMETRY: CPT | Performed by: AUDIOLOGIST

## 2025-07-17 PROCEDURE — 3008F BODY MASS INDEX DOCD: CPT | Performed by: OTOLARYNGOLOGY

## 2025-07-17 PROCEDURE — 92557 COMPREHENSIVE HEARING TEST: CPT | Performed by: AUDIOLOGIST

## 2025-07-17 PROCEDURE — 1036F TOBACCO NON-USER: CPT | Performed by: OTOLARYNGOLOGY

## 2025-07-17 PROCEDURE — 1159F MED LIST DOCD IN RCRD: CPT | Performed by: OTOLARYNGOLOGY

## 2025-07-17 PROCEDURE — 99214 OFFICE O/P EST MOD 30 MIN: CPT | Performed by: OTOLARYNGOLOGY

## 2025-07-17 NOTE — PROGRESS NOTES
AUDIOLOGY ADULT AUDIOMETRIC EVALUATION    Name:  Angela Samuel  :  1955 Age:  70 y.o.  Date of Evaluation:  2025    Reason for visit: Angela is seen in the clinic today at the request of otolaryngology for an audiologic evaluation.     HISTORY  Patient complains of history of eustachian tube dysfunction left ear.  T-Tube placed 25 by Timothy Betancur MD  Patient reports she is doing well; utilizing her nasal spray.      EVALUATION  See scanned audiogram: “Media” > “Audiology Report”.      RESULTS  Otoscopic Evaluation:  Right Ear: clear ear canal  Left Ear: clear ear canal    Immittance Measures:  Tympanometry:  Right Ear: Type A, normal tympanic membrane mobility with normal middle ear pressure  Left Ear: large ear canal volume consistent with a tympanic membrane perforation or a patent pressure equalization tube    Audiometry:  Test Technique and Reliability:   Standard audiometry via supra-aural headphones and insert phones.  Reliability is good.    Pure tone air and bone conduction audiometry:  Right Ear: Hearing sensitivity within normal limits through 3000 Hz, mild sensorineural hearing loss at 4000 Hz rising to normal at 8000 Hz   Left Ear: Improvement; Mild hearing loss at 125 Hz rising to borderline normal to normal hearing sensitivity 250 Hz and above.      Speech Audiometry (Word Recognition Scores):   Right Ear: Excellent at most comfortable level of loudness   Left Ear: Excellent at most comfortable level of loudness     IMPRESSIONS    RIGHT ear: mild sensorineural hearing loss at 4000 Hz  LEFT ear: improvement post T-Tube;  mild hearing loss at 125 Hz rising to borderline normal/normal hearing sensitivity 250 Hz through 8000 Hz     RECOMMENDATIONS  - Follow up with otolaryngology today as scheduled.  - Audiologic evaluation in conjunction with otologic care, if an acute change is noted, and/or annually.    PATIENT EDUCATION  Discussed results, impressions and  recommendations with the patient. Questions were addressed and the patient was encouraged to contact our office should concerns arise.    Time for this encounter: 846/067    Cora Everett M.A., CCC/A   Licensed Audiologist

## 2025-07-17 NOTE — PROGRESS NOTES
Chief Complaint   Patient presents with    Follow-up     LOV 4/25 TUBE CHECK  LT. F/U AUDIO     HPI:  Angela Samuel is a 70 y.o. female who presents today for recheck after left T-tube was placed on April 24, 2025.    She is doing great with this.    Audiogram confirms.  She now has only borderline mild sensorineural hearing loss which is symmetric in both ears.  No conductive component.    I did personally review the audiogram   Previously had left-sided chronic otitis media with effusion.  History of left-sided eustachian dysfunction middle ear effusion which required myringotomy tube in the past - x3.    The last has been out since at least May 2024.    She had a 2-month history starting in November 2024 of having plugged up again after an upper respiratory infection.  Actually feeling better not really bothering her notch.    Audiogram January 2025 which I did review shows normal hearing on the right.  Very mild low frequency conductive hearing on the left with a type C tympanogram.    Her last left tube was placed by Dr. Wood on November 10, 2023 for effusion and plugged ear.     PMH:  Past Medical History:   Diagnosis Date    Ear problems     Eustachian tube dysfunction     Varicella      Past Surgical History:   Procedure Laterality Date    COLPOSCOPY      ENDOMETRIAL ABLATION           Medications:     Current Outpatient Medications:     calcium carbonate 600 mg calcium (1,500 mg) tablet, Take 1 tablet (600 mg) by mouth 2 times a day with meals., Disp: , Rfl:     cholecalciferol (Vitamin D-3) 50 MCG (2000 UT) tablet, Take 1 tablet (50 mcg) by mouth once daily., Disp: , Rfl:     fluticasone (Flonase) 50 mcg/actuation nasal spray, 2 sprays each nostril once daily, 1 bottle, 11 refills, Disp: 48 mL, Rfl: 3    ibuprofen (AdviL) 200 mg tablet, Take 1 tablet (200 mg) by mouth 3 times daily (morning, midday, late afternoon)., Disp: , Rfl:      Allergies:  No Known Allergies     ROS:  Review of systems  "normal unless stated otherwise in the HPI and/or PMH.    Physical Exam:  Temperature 36.3 °C (97.3 °F), height 1.575 m (5' 2\"), weight 50.8 kg (112 lb). Body mass index is 20.49 kg/m².     GENERAL APPEARANCE: Well developed and well nourished.  Alert and oriented in no acute distress.  Normal vocal quality.      HEAD/FACE: No erythema or edema or facial tenderness.  Normal facial nerve function bilaterally.    EAR:       EXTERNAL: Normal pinnas and external auditory canals without lesion or obstructing wax.       MIDDLE EAR: Tympanic membranes intact.  No fluid in either ear.       TUBE STATUS: Left T-tube in place, patent, and dry.       MASTOID CAVITY: N/A       HEARING: Gross hearing assessment is within normal limits.      NOSE:       VISUALIZED USING: Anterior rhinoscopy with headlight and nasal speculum.  Scoping performed       DORSUM: Midline, nontraumatic appearance.       MUCOSA: Normal-appearing.       SECRETIONS: Normal.       SEPTUM: Midline and nonobstructing.       INFERIOR TURBINATES: Normal.       MIDDLE TURBINATES/MEATUS: Normal bilateral       BLEEDING: N/A         ORAL CAVITY/PHARYNX:       TEETH: Adequate dentition.       TONGUE: No mass or lesion.  Normal mobility.       FLOOR OF MOUTH: No mass or lesion.       PALATE: Normal hard palate, soft palate, and uvula.       OROPHARYNX: Normal without mass or lesion.  Tonsils absent       BUCCAL MUCOSA/GBS: Normal without mass or lesion.       LIPS: Normal.    LARYNX/HYPOPHARYNX/NASOPHARYNX: N/A    NECK: No palpable masses or abnormal adenopathy.  Trachea is midline.    THYROID: No thyromegaly or palpable nodule.    SALIVARY GLANDS: Normal bilateral parotid and submandibular glands by inspection and palpation.    TMJ's: Normal.    NEURO: Cranial nerve exam grossly normal bilaterally.           Assessment/Plan   Angela \"Sammi" was seen today for follow-up.  Diagnoses and all orders for this visit:  Dysfunction of left eustachian tube " (Primary)  Chronic otitis media with effusion, unspecified laterality  Myringotomy tube(s) status  Bilateral sensorineural hearing loss    Doing great with her left T-tube which was placed in April 2025.  Hearing has essentially normalized.  Borderline sensorineural hearing loss in nature.  Recheck tube in about 6 months or sooner with any concerns or changes  Follow up in about 6 months (around 1/17/2026) for tube check.     Timothy Betancur MD

## 2025-07-22 ENCOUNTER — APPOINTMENT (OUTPATIENT)
Dept: OTOLARYNGOLOGY | Facility: CLINIC | Age: 70
End: 2025-07-22
Payer: MEDICARE

## 2025-07-22 ENCOUNTER — APPOINTMENT (OUTPATIENT)
Dept: AUDIOLOGY | Facility: CLINIC | Age: 70
End: 2025-07-22
Payer: MEDICARE

## 2026-01-08 ENCOUNTER — APPOINTMENT (OUTPATIENT)
Dept: OTOLARYNGOLOGY | Facility: CLINIC | Age: 71
End: 2026-01-08
Payer: MEDICARE